# Patient Record
Sex: FEMALE | Race: WHITE | NOT HISPANIC OR LATINO | Employment: STUDENT | URBAN - METROPOLITAN AREA
[De-identification: names, ages, dates, MRNs, and addresses within clinical notes are randomized per-mention and may not be internally consistent; named-entity substitution may affect disease eponyms.]

---
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
## 2022-01-03 ENCOUNTER — OFFICE VISIT (OUTPATIENT)
Dept: DERMATOLOGY | Facility: CLINIC | Age: 22
End: 2022-01-03
Payer: COMMERCIAL

## 2022-01-03 VITALS — DIASTOLIC BLOOD PRESSURE: 90 MMHG | SYSTOLIC BLOOD PRESSURE: 137 MMHG | OXYGEN SATURATION: 100 % | HEART RATE: 116 BPM

## 2022-01-03 DIAGNOSIS — L70.0 ACNE VULGARIS: Primary | ICD-10-CM

## 2022-01-03 PROCEDURE — 99214 OFFICE O/P EST MOD 30 MIN: CPT | Performed by: PHYSICIAN ASSISTANT

## 2022-01-03 RX ORDER — DESOGESTREL AND ETHINYL ESTRADIOL 0.15-0.03
1 KIT ORAL DAILY
Qty: 84 TABLET | Refills: 3 | Status: SHIPPED | OUTPATIENT
Start: 2022-01-03 | End: 2022-11-29

## 2022-01-03 RX ORDER — TRETINOIN 0.25 MG/G
CREAM TOPICAL
Qty: 45 G | Refills: 11 | Status: SHIPPED | OUTPATIENT
Start: 2022-01-03 | End: 2022-10-20

## 2022-01-03 RX ORDER — DOXYCYCLINE 100 MG/1
CAPSULE ORAL
Qty: 60 CAPSULE | Refills: 1 | Status: SHIPPED | OUTPATIENT
Start: 2022-01-03 | End: 2022-01-25

## 2022-01-03 RX ORDER — SPIRONOLACTONE 100 MG/1
TABLET, FILM COATED ORAL
Qty: 90 TABLET | Refills: 3 | Status: SHIPPED | OUTPATIENT
Start: 2022-01-03 | End: 2022-12-15

## 2022-01-03 NOTE — PATIENT INSTRUCTIONS
Directions for acne:    PM  1. Wash  2. Apply a pea size of tretinoin cream to entire face  3. Moisturizer over    Start doxycycline (antibiotic) for 2 months with food and full glass of water  Start spironolactone once daily    Follow-up in 3-4 months          ACNE INFORMATION     Acne is a skin disease affecting oil glands and hair follicles in your skin.  When these pores do not drain properly, hair follicles can becomes clogged and bacteria can be trapped causing inflammation to occur. Clinical manifestations range from mild to severe, such as comedones (whiteheads and blackheads) or cysts.     Several factors contribute to acne:     1. Hormonal- Androgen (a type of hormone) can cause oil glands to enlarges and produces more sebum (oil).    2. Bacterial- A specific type of bacteria called Propionibacterium acnes are found in these oily follicles and stimulate more inflammation.     3. Genetics- History of family members (parents or siblings)     4. External factors- mechanical trauma, cosmetics, topical steroids or some oral medications (testosterone, lithium).       Acne can be effectively treated, although response may sometimes be slow. It may take 3-4 months to see 50% improvement.   Where possible, avoid excessively humid conditions such as a sauna, working in an unventilated kitchen or tropical vacations.   If you smoke, stop. Nicotine increases sebum retention and increased scale within the follicles, forming comedones (black and whiteheads).    Minimize the application of oils and cosmetics to the affected skin.   Abrasive skin treatments can aggravate acne.   Try not to scratch or pick the spots.   No relationship between particular foods and acne has been proven (except for skim milk) However, reports suggest low glycemic and low dairy diet are helpful for some people.    Acne myths:    These factors have little effect on acne:  --Chocolate and greasy foods. Eating chocolate or greasy food has little  to no effect on acne.  --Hygiene. Acne isn't caused by dirty skin. In fact, scrubbing the skin too hard or cleansing with harsh soaps or chemicals irritates the skin and can make acne worse.  --Cosmetics. Cosmetics don't necessarily worsen acne, especially if you use oil-free makeup that doesn't clog pores (noncomedogenics) and remove makeup regularly. Nonoily cosmetics don't interfere with the effectiveness of acne drugs.  --Drinking large amounts of water will NOT help acne.

## 2022-01-03 NOTE — PROGRESS NOTES
HPI:   CC: Kandice Mcgowan is a pleasant 21 year old female who presents for evaluation and treatment of acne  Condition has been present for: years. She completed isotretinoin in 2019 and her acne has slowly returned since that time. It is on the face; chest and back have remained clear since isotretinoin.   Pt complains of pain: Yes       Current Outpatient Medications   Medication Sig Dispense Refill     desogestrel-ethinyl estradiol (APRI) 0.15-30 MG-MCG tablet Take 1 tablet by mouth daily 84 tablet 3     doxycycline monohydrate (MONODOX) 100 MG capsule 1 cap PO BID with food and full glass of water 60 capsule 1     spironolactone (ALDACTONE) 100 MG tablet 1 tab po daily 90 tablet 3     tretinoin (RETIN-A) 0.025 % external cream Apply a pea size to entire face QD 45 g 11     ibuprofen (ADVIL,MOTRIN) 100 MG chewable tablet Take 400 mg by mouth every 8 hours as needed for fever       Loratadine (CLARITIN PO) Take  by mouth.       MIRALAX OR as needed       Allergies   Allergen Reactions     Amoxicillin Rash     Augmentin Rash     Ceftin [Cefuroxime] Rash     Denies any other skin complaints, in general feels well: Yes  Review of symptoms otherwise negative:Yes    PHYSICAL EXAM:   A&Ox3: Yes   Well developed/well nourished female Yes   Mood appropriate Yes      BP (!) 137/90   Pulse 116   SpO2 100%   Type 2 skin. Mood appropriate  Alert and Oriented X 3. Well developed, well nourished in no distress.  General appearance: Normal  Head including face: Normal  Eyes: conjunctiva and lids: Normal  Mouth: Lips, teeth, gums: Normal  Neck: Normal  Back:   Cardiovascular: Exam of peripheral vascular system by observation for swelling, varicosities, edema: Normal  Extremities: digits/nails (clubbing): Normal  Right upper extremity: Normal  Left upper extremity: Normal  Right lower extremity: Normal  Left lower extremity: Normal  Skin: Scalp and body hair: See below     Comedones Papules/Pustules Cysts Staining Scarring    Face/Neck 1-2+ 2+ lower cheeks 0 2+ 0   Chest 0 0 0 0 0   Back 0 0 0 0 0     Telangiectasias: No Fixed Erythema: No Exoriations: No   Other Physical Exam Findings:    ASSESSMENT & PLAN:     1. Acne Vulgaris - advised on diagnosis and treatment options. Discussed use of topical medications and antibiotics. Discussed spironolactone vs isotretinoin. She would prefer to start with isotretinoin for now.   --Start doxycycline 100 mg BID x 2 months. Advised to take with food. Discussed risk of GI upset, esophagitis and photosensitivity.   --Start spironolactone 100 mg daily. Advised on potential for hypotension, teratogenetic effects, menstrual irregularities, hyperkalemia and need for Q 6 month K+ checks.   --Start tretinoin 0.025% cream daily. Discussed potential for dryness/irritation. Advised to use emollients if needed.            Pt advised on use and risks including photosensitivity, allergic reactions, GI upset, headaches, nausea, erythema, scaling, vertigo, asthralgias, blood clots:Yes    Follow-up: 3-4 months  CC:   Scribed By: Gosia Chen, MS, PA-C

## 2022-01-03 NOTE — LETTER
1/3/2022         RE: Kandice Mcgowan  [id number] [address] [id number]        Dear Colleague,    Thank you for referring your patient, Kandice Mcgowan, to the [facility]. Please see a copy of my visit note below.    HPI:   CC: Kandice Mcgowan is a pleasant 21 year old female who presents for evaluation and treatment of acne  Condition has been present for: years. She completed isotretinoin in 2019 and her acne has slowly returned since that time. It is on the face; chest and back have remained clear since isotretinoin.   Pt complains of pain: Yes       Current Outpatient Medications   Medication Sig Dispense Refill     desogestrel-ethinyl estradiol (APRI) 0.15-30 MG-MCG tablet Take 1 tablet by mouth daily 84 tablet 3     doxycycline monohydrate (MONODOX) 100 MG capsule 1 cap PO BID with food and full glass of water 60 capsule 1     spironolactone (ALDACTONE) 100 MG tablet 1 tab po daily 90 tablet 3     tretinoin (RETIN-A) 0.025 % external cream Apply a pea size to entire face QD 45 g 11     ibuprofen (ADVIL,MOTRIN) 100 MG chewable tablet Take 400 mg by mouth every 8 hours as needed for fever       Loratadine (CLARITIN PO) Take  by mouth.       MIRALAX OR as needed       Allergies   Allergen Reactions     Amoxicillin Rash     Augmentin Rash     Ceftin [Cefuroxime] Rash     Denies any other skin complaints, in general feels well: Yes  Review of symptoms otherwise negative:Yes    PHYSICAL EXAM:   A&Ox3: Yes   Well developed/well nourished female Yes   Mood appropriate Yes      BP (!) 137/90   Pulse 116   SpO2 100%   Type 2 skin. Mood appropriate  Alert and Oriented X 3. Well developed, well nourished in no distress.  General appearance: Normal  Head including face: Normal  Eyes: conjunctiva and lids: Normal  Mouth: Lips, teeth, gums: Normal  Neck: Normal  Back:   Cardiovascular: Exam of peripheral vascular system by observation for swelling, varicosities, edema: Normal  Extremities:  digits/nails (clubbing): Normal  Right upper extremity: Normal  Left upper extremity: Normal  Right lower extremity: Normal  Left lower extremity: Normal  Skin: Scalp and body hair: See below     Comedones Papules/Pustules Cysts Staining Scarring   Face/Neck 1-2+ 2+ lower cheeks 0 2+ 0   Chest 0 0 0 0 0   Back 0 0 0 0 0     Telangiectasias: No Fixed Erythema: No Exoriations: No   Other Physical Exam Findings:    ASSESSMENT & PLAN:     1. Acne Vulgaris - advised on diagnosis and treatment options. Discussed use of topical medications and antibiotics. Discussed spironolactone vs isotretinoin. She would prefer to start with isotretinoin for now.   --Start doxycycline 100 mg BID x 2 months. Advised to take with food. Discussed risk of GI upset, esophagitis and photosensitivity.   --Start spironolactone 100 mg daily. Advised on potential for hypotension, teratogenetic effects, menstrual irregularities, hyperkalemia and need for Q 6 month K+ checks.   --Start tretinoin 0.025% cream daily. Discussed potential for dryness/irritation. Advised to use emollients if needed.            Pt advised on use and risks including photosensitivity, allergic reactions, GI upset, headaches, nausea, erythema, scaling, vertigo, asthralgias, blood clots:Yes    Follow-up: 3-4 months  CC:   Scribed By: Gosia Chen, MS, PAOTONIEL          Again, thank you for allowing me to participate in the care of your patient.        Sincerely,        Gosia Chen PA-C

## 2022-01-30 ENCOUNTER — MYC REFILL (OUTPATIENT)
Dept: DERMATOLOGY | Facility: CLINIC | Age: 22
End: 2022-01-30
Payer: COMMERCIAL

## 2022-01-30 DIAGNOSIS — L70.0 ACNE VULGARIS: ICD-10-CM

## 2022-01-31 RX ORDER — DOXYCYCLINE 100 MG/1
CAPSULE ORAL
Qty: 60 CAPSULE | Refills: 0 | Status: SHIPPED | OUTPATIENT
Start: 2022-01-31 | End: 2022-10-20

## 2022-02-24 NOTE — PROGRESS NOTES
HPI:   Kandice Mcgowan is a 17 year old female who presents for acne on isotretinoin s/p month #3.   chief complaint  Condition has been present for: over 1 year  Pt complains of pain: Yes     Previous treatments include: numerous OTC, clindamycin and adapalene  Areas Involved: face, chest and back  Current Outpatient Medications   Medication Sig Dispense Refill     desogestrel-ethinyl estradiol (APRI) 0.15-30 MG-MCG per tablet Take 1 tablet by mouth daily 84 tablet 3     ibuprofen (ADVIL,MOTRIN) 100 MG chewable tablet Take 400 mg by mouth every 8 hours as needed for fever       ISOtretinoin (ABSORICA) 30 MG capsule Take 1 capsule (30 mg) by mouth 2 times daily 60 capsule 0     Loratadine (CLARITIN PO) Take  by mouth.       MIRALAX OR as needed       Allergies   Allergen Reactions     Amoxicillin Rash     Augmentin Rash     Ceftin [Cefuroxime] Rash     Denies any other skin complaints, in general feels well: Yes  Review of symptoms otherwise negative:Yes    PHYSICAL EXAM:   A&Ox3: Yes   Well developed/well nourished female Yes   Mood appropriate Yes      /80   Pulse 99   SpO2 99%   Type 2 skin. Mood appropriate  Alert and Oriented X 3. Well developed, well nourished in no distress.  General appearance: Normal  Head including face: Normal  Eyes: conjunctiva and lids: Normal  Mouth: Lips, teeth, gums: Normal  Neck: Normal  Back: 2+ papules and comedones  Cardiovascular: Exam of peripheral vascular system by observation for swelling, varicosities, edema: Normal  Extremities: digits/nails (clubbing): Normal  Right upper extremity: Normal  Left upper extremity: Normal  Right lower extremity: Normal  Left lower extremity: Normal  Skin: Scalp and body hair: See below     Comedones Papules/Pustules Cysts Staining Scarring   Face/Neck 1+ 0 0 1+ 0   Chest 1+ 0 0 0 0   Back 1+ 1+ 0 0 0     Telangiectasias: No Fixed Erythema: No Exoriations: No   Other Physical Exam Findings:    ASSESSMENT & PLAN:     1. Acne Vulgaris  Report given on pt at approx 2010. Pt transferred to stepdown unit room 8098 in stable condition with family at bedside. This RN and charge RN transported pt on bedside monitor to adequately visualize pt's rhythm and VS. Meds delivered with pt.   - s/p 3 month of isotretinoin and doing well. Back pain has resolved.Mood has been good. No depression. No personal h/o depression; no personal or fhx of IBD. She is abstinent. UCG negative today.     Accutane is discussed fully with the patient. It is a very effective drug to treat acne vulgaris but has many significant side effects. Chief among these are teratogensis, hepatic injury, dyslipidemia and severe drying of the mucous membranes. All of these issues have been discussed in details. Monthly blood tests to monitor lipids and liver functions will be necessary. Expect painful dryness and/or fissuring around the lips, eyes, and other moist areas of the body. Balms may be protective. Contact lens may be too painful to wear temporarily while on this drug. Episodes of significant depression have been reported, including suicidal ideation and attempts in rare cases. It may also cause pseudotumor cerebri and hyperostosis. The patient will report any such changes in mood, depressive symptoms or suicidal thoughts, headaches, joint or bone pains.    Female patients MUST use two simultaneous methods of family planning. Accutane is Category X for pregnancy, meaning it will cause fetal teratogenic malformations, and pregnancy MUST be avoided while on this drug.    The dose is 0.5-1 mg/kg in two divided doses for 15-20 weeks.    After discussion of these important issues, s/he indicates complete understanding of all of the above, and does wish to proceed with accutane therapy.    --Continue isotretinoin 60 mg daily with food month #4  --Total dose 4800 mg  --Goal dose 11,659 mg  --Form of birth control is abstinence   --Continue Desogen every day. Advised to take at same time every single day. Discussed increased risk of DVT; denies any personal or fhx of coagulopathy; does not smoke. Advised if experiencing any unexplained pain or swelling in legs, CP or SOB to contact clinic immediately.   --Standing CBC CMP lipid panel,  [facility]  --iPledge [phone]          Pt advised on use and risks including photosensitivity, allergic reactions, GI upset, headaches, nausea, erythema, scaling, vertigo, asthralgias, blood clots:Yes    Follow-up: 1 month  CC:   Scribed By: Gosia Chen MS, PAJorgeC

## 2022-03-01 DIAGNOSIS — L70.0 ACNE VULGARIS: ICD-10-CM

## 2022-03-01 NOTE — TELEPHONE ENCOUNTER
Seen 1-3-22 and was only to be on Doxycyline x 2 months.       Did patient request refill? Or is this an automated refill request?    Is scheduled for follow up appt in April.     Wanda Noe RN

## 2022-03-01 NOTE — TELEPHONE ENCOUNTER
Requested Prescriptions   Pending Prescriptions Disp Refills     doxycycline monohydrate (MONODOX) 100 MG capsule 60 capsule 0     Si cap PO BID with food and full glass of water       There is no refill protocol information for this order        Last office visit: 1/3/2022 with prescribing provider:  GOSIA CHEN    Future Office Visit:   Next 5 appointments (look out 90 days)    2022  2:15 PM  (Arrive by 2:00 PM)  Return Visit with Gosia Chen PA-C  [facility] ([facility] ) [address] [facility] [id number] [phone]               [facility]  [facility]

## 2022-03-04 RX ORDER — DOXYCYCLINE 100 MG/1
CAPSULE ORAL
Qty: 60 CAPSULE | Refills: 0 | Status: CANCELLED | OUTPATIENT
Start: 2022-03-04

## 2022-03-04 RX ORDER — DOXYCYCLINE 100 MG/1
CAPSULE ORAL
Qty: 60 CAPSULE | Refills: 0 | OUTPATIENT
Start: 2022-03-04

## 2022-03-04 NOTE — TELEPHONE ENCOUNTER
Pt mycharted and stated she did not request a refill of Doxycycline. Refill request denied.  Griselda MOONEY RN BSN PHN  Specialty Clinics

## 2022-04-11 ENCOUNTER — OFFICE VISIT (OUTPATIENT)
Dept: DERMATOLOGY | Facility: CLINIC | Age: 22
End: 2022-04-11
Payer: COMMERCIAL

## 2022-04-11 VITALS — HEART RATE: 122 BPM | OXYGEN SATURATION: 99 %

## 2022-04-11 DIAGNOSIS — L70.0 ACNE VULGARIS: Primary | ICD-10-CM

## 2022-04-11 PROCEDURE — 99213 OFFICE O/P EST LOW 20 MIN: CPT | Performed by: PHYSICIAN ASSISTANT

## 2022-04-11 RX ORDER — SPIRONOLACTONE 50 MG/1
TABLET, FILM COATED ORAL
Qty: 90 TABLET | Refills: 3 | Status: SHIPPED | OUTPATIENT
Start: 2022-04-11 | End: 2023-01-19

## 2022-04-11 NOTE — PROGRESS NOTES
HPI:   CC: Kandice Mcgowan is a pleasant 21 year old female who presents for recheck of acne. She is currently on spironolactone 100 mg and tretinoin. She has had improvement but is still making new cysts.   Condition has been present for: years. She completed isotretinoin in 2019 and her acne has slowly returned since that time. It is on the face; chest and back have remained clear since isotretinoin.   Pt complains of pain: Yes       Current Outpatient Medications   Medication Sig Dispense Refill     desogestrel-ethinyl estradiol (APRI) 0.15-30 MG-MCG tablet Take 1 tablet by mouth daily 84 tablet 3     ibuprofen (ADVIL,MOTRIN) 100 MG chewable tablet Take 400 mg by mouth every 8 hours as needed for fever       Loratadine (CLARITIN PO) Take  by mouth.       MIRALAX OR as needed       spironolactone (ALDACTONE) 100 MG tablet 1 tab po daily 90 tablet 3     tretinoin (RETIN-A) 0.025 % external cream Apply a pea size to entire face QD 45 g 11     doxycycline monohydrate (MONODOX) 100 MG capsule 1 cap PO BID with food and full glass of water (Patient not taking: Reported on 4/11/2022) 60 capsule 0     Allergies   Allergen Reactions     Amoxicillin Rash     Augmentin Rash     Ceftin [Cefuroxime] Rash     Denies any other skin complaints, in general feels well: Yes  Review of symptoms otherwise negative:Yes    PHYSICAL EXAM:   A&Ox3: Yes   Well developed/well nourished female Yes   Mood appropriate Yes      Pulse (!) 122   SpO2 99%   Type 2 skin. Mood appropriate  Alert and Oriented X 3. Well developed, well nourished in no distress.  General appearance: Normal  Head including face: Normal  Eyes: conjunctiva and lids: Normal  Mouth: Lips, teeth, gums: Normal  Neck: Normal  Back:   Cardiovascular: Exam of peripheral vascular system by observation for swelling, varicosities, edema: Normal  Extremities: digits/nails (clubbing): Normal  Right upper extremity: Normal  Left upper extremity: Normal  Right lower extremity:  Normal  Left lower extremity: Normal  Skin: Scalp and body hair: See below     Comedones Papules/Pustules Cysts Staining Scarring   Face/Neck 1-2+ 0-1+ lower cheeks 0 2-3+ 0   Chest 0 0 0 0 0   Back 0 0 0 0 0     Telangiectasias: No Fixed Erythema: No Exoriations: No   Other Physical Exam Findings:    ASSESSMENT & PLAN:     1. Acne Vulgaris - improved but still making new cysts. Discussed increasing dose of spironolactone and she is amenable to trying this. She does not want another antibiotic. she does like the tretinoin cream. advised on diagnosis and treatment options. Discussed use of topical medications and antibiotics.      --Increase to spironolactone 150 mg daily. Advised on potential for hypotension, teratogenetic effects, menstrual irregularities, hyperkalemia and need for Q 6 month K+ checks.   --Continue tretinoin 0.025% cream daily. Discussed potential for dryness/irritation. Advised to use emollients if needed.            Pt advised on use and risks including photosensitivity, allergic reactions, GI upset, headaches, nausea, erythema, scaling, vertigo, asthralgias, blood clots:Yes    Follow-up: 3-4 months  CC:   Scribed By: Gosia Chen, MS, PA-C

## 2022-04-11 NOTE — LETTER
4/11/2022         RE: Knadice Mcgowan  [address] [address] [address]        Dear Colleague,    Thank you for referring your patient, Kandice Mcgowan, to the [facility]. Please see a copy of my visit note below.    HPI:   CC: Kandice Mcgowan is a pleasant 21 year old female who presents for recheck of acne. She is currently on spironolactone 100 mg and tretinoin. She has had improvement but is still making new cysts.   Condition has been present for: years. She completed isotretinoin in 2019 and her acne has slowly returned since that time. It is on the face; chest and back have remained clear since isotretinoin.   Pt complains of pain: Yes       Current Outpatient Medications   Medication Sig Dispense Refill     desogestrel-ethinyl estradiol (APRI) 0.15-30 MG-MCG tablet Take 1 tablet by mouth daily 84 tablet 3     ibuprofen (ADVIL,MOTRIN) 100 MG chewable tablet Take 400 mg by mouth every 8 hours as needed for fever       Loratadine (CLARITIN PO) Take  by mouth.       MIRALAX OR as needed       spironolactone (ALDACTONE) 100 MG tablet 1 tab po daily 90 tablet 3     tretinoin (RETIN-A) 0.025 % external cream Apply a pea size to entire face QD 45 g 11     doxycycline monohydrate (MONODOX) 100 MG capsule 1 cap PO BID with food and full glass of water (Patient not taking: Reported on 4/11/2022) 60 capsule 0     Allergies   Allergen Reactions     Amoxicillin Rash     Augmentin Rash     Ceftin [Cefuroxime] Rash     Denies any other skin complaints, in general feels well: Yes  Review of symptoms otherwise negative:Yes    PHYSICAL EXAM:   A&Ox3: Yes   Well developed/well nourished female Yes   Mood appropriate Yes      Pulse (!) 122   SpO2 99%   Type 2 skin. Mood appropriate  Alert and Oriented X 3. Well developed, well nourished in no distress.  General appearance: Normal  Head including face: Normal  Eyes: conjunctiva and lids: Normal  Mouth: Lips, teeth, gums: Normal  Neck:  Normal  Back:   Cardiovascular: Exam of peripheral vascular system by observation for swelling, varicosities, edema: Normal  Extremities: digits/nails (clubbing): Normal  Right upper extremity: Normal  Left upper extremity: Normal  Right lower extremity: Normal  Left lower extremity: Normal  Skin: Scalp and body hair: See below     Comedones Papules/Pustules Cysts Staining Scarring   Face/Neck 1-2+ 0-1+ lower cheeks 0 2-3+ 0   Chest 0 0 0 0 0   Back 0 0 0 0 0     Telangiectasias: No Fixed Erythema: No Exoriations: No   Other Physical Exam Findings:    ASSESSMENT & PLAN:     1. Acne Vulgaris - improved but still making new cysts. Discussed increasing dose of spironolactone and she is amenable to trying this. She does not want another antibiotic. she does like the tretinoin cream. advised on diagnosis and treatment options. Discussed use of topical medications and antibiotics.      --Increase to spironolactone 150 mg daily. Advised on potential for hypotension, teratogenetic effects, menstrual irregularities, hyperkalemia and need for Q 6 month K+ checks.   --Continue tretinoin 0.025% cream daily. Discussed potential for dryness/irritation. Advised to use emollients if needed.            Pt advised on use and risks including photosensitivity, allergic reactions, GI upset, headaches, nausea, erythema, scaling, vertigo, asthralgias, blood clots:Yes    Follow-up: 3-4 months  CC:   Scribed By: Gosia Chen, MS, PAOTONIEL        Again, thank you for allowing me to participate in the care of your patient.        Sincerely,        Gosia Chen PA-C

## 2022-07-02 ENCOUNTER — HEALTH MAINTENANCE LETTER (OUTPATIENT)
Age: 22
End: 2022-07-02

## 2022-08-11 ENCOUNTER — OFFICE VISIT (OUTPATIENT)
Dept: DERMATOLOGY | Facility: CLINIC | Age: 22
End: 2022-08-11
Payer: COMMERCIAL

## 2022-08-11 DIAGNOSIS — L70.0 ACNE VULGARIS: ICD-10-CM

## 2022-08-11 DIAGNOSIS — Z51.81 THERAPEUTIC DRUG MONITORING: Primary | ICD-10-CM

## 2022-08-11 LAB
ALBUMIN SERPL-MCNC: 3.7 G/DL (ref 3.4–5)
ALP SERPL-CCNC: 61 U/L (ref 40–150)
ALT SERPL W P-5'-P-CCNC: 37 U/L (ref 0–50)
ANION GAP SERPL CALCULATED.3IONS-SCNC: 10 MMOL/L (ref 3–14)
AST SERPL W P-5'-P-CCNC: 18 U/L (ref 0–35)
BILIRUB SERPL-MCNC: 0.5 MG/DL (ref 0.2–1.3)
BUN SERPL-MCNC: 10 MG/DL (ref 7–30)
CALCIUM SERPL-MCNC: 9.7 MG/DL (ref 8.5–10.1)
CHLORIDE BLD-SCNC: 104 MMOL/L (ref 96–110)
CHOLEST SERPL-MCNC: 177 MG/DL
CO2 SERPL-SCNC: 23 MMOL/L (ref 20–32)
CREAT SERPL-MCNC: 0.61 MG/DL (ref 0.5–1)
ERYTHROCYTE [DISTWIDTH] IN BLOOD BY AUTOMATED COUNT: 12.8 % (ref 10–15)
FASTING STATUS PATIENT QL REPORTED: NO
GFR SERPL CREATININE-BSD FRML MDRD: >90 ML/MIN/1.73M2
GLUCOSE BLD-MCNC: 85 MG/DL (ref 70–99)
HCG UR QL: NEGATIVE
HCT VFR BLD AUTO: 42.2 % (ref 35–47)
HDLC SERPL-MCNC: 68 MG/DL
HGB BLD-MCNC: 14 G/DL (ref 11.7–15.7)
LDLC SERPL CALC-MCNC: 80 MG/DL
MCH RBC QN AUTO: 27.4 PG (ref 26.5–33)
MCHC RBC AUTO-ENTMCNC: 33.2 G/DL (ref 31.5–36.5)
MCV RBC AUTO: 83 FL (ref 78–100)
NONHDLC SERPL-MCNC: 109 MG/DL
PLATELET # BLD AUTO: 418 10E3/UL (ref 150–450)
POTASSIUM BLD-SCNC: 4 MMOL/L (ref 3.4–5.3)
PROT SERPL-MCNC: 8.3 G/DL (ref 6.8–8.8)
RBC # BLD AUTO: 5.11 10E6/UL (ref 3.8–5.2)
SODIUM SERPL-SCNC: 137 MMOL/L (ref 133–144)
TRIGL SERPL-MCNC: 143 MG/DL
WBC # BLD AUTO: 9.5 10E3/UL (ref 4–11)

## 2022-08-11 PROCEDURE — 80061 LIPID PANEL: CPT | Performed by: PHYSICIAN ASSISTANT

## 2022-08-11 PROCEDURE — 80053 COMPREHEN METABOLIC PANEL: CPT | Performed by: PHYSICIAN ASSISTANT

## 2022-08-11 PROCEDURE — 81025 URINE PREGNANCY TEST: CPT | Performed by: PHYSICIAN ASSISTANT

## 2022-08-11 PROCEDURE — 36415 COLL VENOUS BLD VENIPUNCTURE: CPT | Performed by: PHYSICIAN ASSISTANT

## 2022-08-11 PROCEDURE — 99214 OFFICE O/P EST MOD 30 MIN: CPT | Performed by: PHYSICIAN ASSISTANT

## 2022-08-11 PROCEDURE — 85027 COMPLETE CBC AUTOMATED: CPT | Performed by: PHYSICIAN ASSISTANT

## 2022-08-11 ASSESSMENT — PAIN SCALES - GENERAL: PAINLEVEL: NO PAIN (1)

## 2022-08-11 NOTE — PROGRESS NOTES
HPI:   CC: Kandice Mcgowan is a pleasant 21 year old female who presents for recheck of acne. She is currently on spironolactone 150 mg and tretinoin. She has had improvement but is still making new cysts.   Condition has been present for: years. She completed isotretinoin in 2019 and her acne has slowly returned since that time. It is on the face; chest and back have remained clear since isotretinoin.   Pt complains of pain: Yes       Current Outpatient Medications   Medication Sig Dispense Refill     desogestrel-ethinyl estradiol (APRI) 0.15-30 MG-MCG tablet Take 1 tablet by mouth daily 84 tablet 3     ibuprofen (ADVIL,MOTRIN) 100 MG chewable tablet Take 400 mg by mouth every 8 hours as needed for fever       Loratadine (CLARITIN PO) Take  by mouth.       MIRALAX OR as needed       spironolactone (ALDACTONE) 100 MG tablet 1 tab po daily 90 tablet 3     spironolactone (ALDACTONE) 50 MG tablet 1 tab po daily 90 tablet 3     tretinoin (RETIN-A) 0.025 % external cream Apply a pea size to entire face QD 45 g 11     doxycycline monohydrate (MONODOX) 100 MG capsule 1 cap PO BID with food and full glass of water (Patient not taking: No sig reported) 60 capsule 0     Allergies   Allergen Reactions     Amoxicillin Rash     Augmentin Rash     Ceftin [Cefuroxime] Rash     Denies any other skin complaints, in general feels well: Yes  Review of symptoms otherwise negative:Yes    PHYSICAL EXAM:   A&Ox3: Yes   Well developed/well nourished female Yes   Mood appropriate Yes      There were no vitals taken for this visit.  Type 2 skin. Mood appropriate  Alert and Oriented X 3. Well developed, well nourished in no distress.  General appearance: Normal  Head including face: Normal  Eyes: conjunctiva and lids: Normal  Mouth: Lips, teeth, gums: Normal  Neck: Normal  Back:   Cardiovascular: Exam of peripheral vascular system by observation for swelling, varicosities, edema: Normal  Extremities: digits/nails (clubbing): Normal  Right  upper extremity: Normal  Left upper extremity: Normal  Right lower extremity: Normal  Left lower extremity: Normal  Skin: Scalp and body hair: See below     Comedones Papules/Pustules Cysts Staining Scarring   Face/Neck 1-2+ 1-2+ 0 2-3+ 0   Chest 0 0 0 0 0   Back 0 0 0 0 0     Telangiectasias: No Fixed Erythema: No Exoriations: No   Other Physical Exam Findings:    ASSESSMENT & PLAN:     1. Acne Vulgaris - improved but still making new cysts. Discussed increasing dose of spironolactone vs topical candace vs another course of isotretinoin. She would like to repeat accutane. No IBD or depression.       --Continue spironolactone 150 mg daily. Advised on potential for hypotension, teratogenetic effects, menstrual irregularities, hyperkalemia and need for Q 6 month K+ checks.     Accutane is discussed fully with the patient. It is a very effective drug to treat acne vulgaris but has many significant side effects. Chief among these are teratogensis, hepatic injury, dyslipidemia and severe drying of the mucous membranes. All of these issues have been discussed in details. Monthly blood tests to monitor lipids and liver functions will be necessary. Expect painful dryness and/or fissuring around the lips, eyes, and other moist areas of the body. Balms may be protective. Contact lens may be too painful to wear temporarily while on this drug. Episodes of significant depression have been reported, including suicidal ideation and attempts in rare cases. It may also cause pseudotumor cerebri and hyperostosis. The patient will report any such changes in mood, depressive symptoms or suicidal thoughts, headaches, joint or bone pains.    Female patients MUST use two simultaneous methods of family planning. Accutane is Category X for pregnancy, meaning it will cause fetal teratogenic malformations, and pregnancy MUST be avoided while on this drug.    The dose is 0.5-1 mg/kg in two divided doses for 15-20 weeks.    After discussion of  these important issues, s/he indicates complete understanding of all of the above, and does wish to proceed with accutane therapy.    --iPlege:  --Standing labs  --Abstinence             Pt advised on use and risks including photosensitivity, allergic reactions, GI upset, headaches, nausea, erythema, scaling, vertigo, asthralgias, blood clots:Yes    Follow-up: 30+ days  CC:   Scribed By: Gosia Chen, MS, PA-C

## 2022-08-11 NOTE — NURSING NOTE
Chief Complaint   Patient presents with     Acne     This are ok, still breaking out on cheeks        There were no vitals filed for this visit.  Wt Readings from Last 1 Encounters:   12/08/21 103 kg (227 lb) (99 %, Z= 2.28)*     * Growth percentiles are based on CDC (Girls, 2-20 Years) data.       Elisabet Meyers LPN .................8/11/2022

## 2022-08-11 NOTE — LETTER
8/11/2022         RE: Kandice Mcgowan  [address] [address] [address]        Dear Colleague,    Thank you for referring your patient, Kandice Mcgowan, to the [facility]. Please see a copy of my visit note below.    HPI:   CC: Kandice Mcgowan is a pleasant 21 year old female who presents for recheck of acne. She is currently on spironolactone 150 mg and tretinoin. She has had improvement but is still making new cysts.   Condition has been present for: years. She completed isotretinoin in 2019 and her acne has slowly returned since that time. It is on the face; chest and back have remained clear since isotretinoin.   Pt complains of pain: Yes       Current Outpatient Medications   Medication Sig Dispense Refill     desogestrel-ethinyl estradiol (APRI) 0.15-30 MG-MCG tablet Take 1 tablet by mouth daily 84 tablet 3     ibuprofen (ADVIL,MOTRIN) 100 MG chewable tablet Take 400 mg by mouth every 8 hours as needed for fever       Loratadine (CLARITIN PO) Take  by mouth.       MIRALAX OR as needed       spironolactone (ALDACTONE) 100 MG tablet 1 tab po daily 90 tablet 3     spironolactone (ALDACTONE) 50 MG tablet 1 tab po daily 90 tablet 3     tretinoin (RETIN-A) 0.025 % external cream Apply a pea size to entire face QD 45 g 11     doxycycline monohydrate (MONODOX) 100 MG capsule 1 cap PO BID with food and full glass of water (Patient not taking: No sig reported) 60 capsule 0     Allergies   Allergen Reactions     Amoxicillin Rash     Augmentin Rash     Ceftin [Cefuroxime] Rash     Denies any other skin complaints, in general feels well: Yes  Review of symptoms otherwise negative:Yes    PHYSICAL EXAM:   A&Ox3: Yes   Well developed/well nourished female Yes   Mood appropriate Yes      There were no vitals taken for this visit.  Type 2 skin. Mood appropriate  Alert and Oriented X 3. Well developed, well nourished in no distress.  General appearance: Normal  Head including face: Normal  Eyes:  conjunctiva and lids: Normal  Mouth: Lips, teeth, gums: Normal  Neck: Normal  Back:   Cardiovascular: Exam of peripheral vascular system by observation for swelling, varicosities, edema: Normal  Extremities: digits/nails (clubbing): Normal  Right upper extremity: Normal  Left upper extremity: Normal  Right lower extremity: Normal  Left lower extremity: Normal  Skin: Scalp and body hair: See below     Comedones Papules/Pustules Cysts Staining Scarring   Face/Neck 1-2+ 1-2+ 0 2-3+ 0   Chest 0 0 0 0 0   Back 0 0 0 0 0     Telangiectasias: No Fixed Erythema: No Exoriations: No   Other Physical Exam Findings:    ASSESSMENT & PLAN:     1. Acne Vulgaris - improved but still making new cysts. Discussed increasing dose of spironolactone vs topical candace vs another course of isotretinoin. She would like to repeat accutane. No IBD or depression.       --Continue spironolactone 150 mg daily. Advised on potential for hypotension, teratogenetic effects, menstrual irregularities, hyperkalemia and need for Q 6 month K+ checks.     Accutane is discussed fully with the patient. It is a very effective drug to treat acne vulgaris but has many significant side effects. Chief among these are teratogensis, hepatic injury, dyslipidemia and severe drying of the mucous membranes. All of these issues have been discussed in details. Monthly blood tests to monitor lipids and liver functions will be necessary. Expect painful dryness and/or fissuring around the lips, eyes, and other moist areas of the body. Balms may be protective. Contact lens may be too painful to wear temporarily while on this drug. Episodes of significant depression have been reported, including suicidal ideation and attempts in rare cases. It may also cause pseudotumor cerebri and hyperostosis. The patient will report any such changes in mood, depressive symptoms or suicidal thoughts, headaches, joint or bone pains.    Female patients MUST use two simultaneous methods of  family planning. Accutane is Category X for pregnancy, meaning it will cause fetal teratogenic malformations, and pregnancy MUST be avoided while on this drug.    The dose is 0.5-1 mg/kg in two divided doses for 15-20 weeks.    After discussion of these important issues, s/he indicates complete understanding of all of the above, and does wish to proceed with accutane therapy.    --iPlege:  --Standing labs  --Abstinence             Pt advised on use and risks including photosensitivity, allergic reactions, GI upset, headaches, nausea, erythema, scaling, vertigo, asthralgias, blood clots:Yes    Follow-up: 30+ days  CC:   Scribed By: Gosia Chen, MS, PAOTONIEL      Again, thank you for allowing me to participate in the care of your patient.        Sincerely,        Gosia Chen PA-C

## 2022-09-22 ENCOUNTER — LAB (OUTPATIENT)
Dept: LAB | Facility: CLINIC | Age: 22
End: 2022-09-22
Payer: COMMERCIAL

## 2022-09-22 ENCOUNTER — VIRTUAL VISIT (OUTPATIENT)
Dept: DERMATOLOGY | Facility: CLINIC | Age: 22
End: 2022-09-22
Payer: COMMERCIAL

## 2022-09-22 DIAGNOSIS — Z51.81 THERAPEUTIC DRUG MONITORING: ICD-10-CM

## 2022-09-22 DIAGNOSIS — L70.0 ACNE VULGARIS: Primary | ICD-10-CM

## 2022-09-22 LAB — HCG UR QL: NEGATIVE

## 2022-09-22 PROCEDURE — 99214 OFFICE O/P EST MOD 30 MIN: CPT | Mod: 95 | Performed by: PHYSICIAN ASSISTANT

## 2022-09-22 PROCEDURE — 81025 URINE PREGNANCY TEST: CPT

## 2022-09-22 RX ORDER — ISOTRETINOIN 40 MG/1
CAPSULE ORAL
Qty: 30 CAPSULE | Refills: 0 | Status: SHIPPED | OUTPATIENT
Start: 2022-09-22 | End: 2023-12-07

## 2022-09-22 NOTE — LETTER
"    9/22/2022         RE: Kandice Mcgowan  [address] [facility] [address]        Dear Colleague,    Thank you for referring your patient, Kandice Mcgowan, to the [facility]. Please see a copy of my visit note below.    The patient has been notified of the following:      \"We have found that certain health care needs can be provided without the need for a face to face visit.  This service lets us provide the care you need with a phone conversation.       I will have full access to your [place] medical record during this entire phone call.   I will be taking notes for your medical record.      Since this is like an office visit, we will bill your insurance company for this service.       There are potential benefits and risks of telephone visits (e.g. limits to patient confidentiality) that differ from in-person visits.?  Confidentiality still applies for telephone services, and nobody will record the visit.  It is important to be in a quiet, private space that is free of distractions (including cell phone or other devices) during the visit.??      If during the course of the call I believe a telephone visit is not appropriate, you will not be charged for this service\"     Consent has been obtained for this service by care team member: Yes   HPI:   CC: Kandice Mcgowan is a pleasant 21 year old female who presents via video visit for recheck of acne. Here today for accutane start.   Condition has been present for: years. She completed isotretinoin in 2019 and her acne has slowly returned since that time. It is on the face; chest and back have remained clear since isotretinoin.   Pt complains of pain: Yes       Current Outpatient Medications   Medication Sig Dispense Refill     desogestrel-ethinyl estradiol (APRI) 0.15-30 MG-MCG tablet Take 1 tablet by mouth daily 84 tablet 3     ibuprofen (ADVIL,MOTRIN) 100 MG chewable tablet Take 400 mg by mouth every 8 hours as needed for fever       " Loratadine (CLARITIN PO) Take  by mouth.       MIRALAX OR as needed       spironolactone (ALDACTONE) 100 MG tablet 1 tab po daily 90 tablet 3     spironolactone (ALDACTONE) 50 MG tablet 1 tab po daily 90 tablet 3     tretinoin (RETIN-A) 0.025 % external cream Apply a pea size to entire face QD 45 g 11     doxycycline monohydrate (MONODOX) 100 MG capsule 1 cap PO BID with food and full glass of water (Patient not taking: Reported on 9/22/2022) 60 capsule 0     Allergies   Allergen Reactions     Amoxicillin Rash     Augmentin Rash     Ceftin [Cefuroxime] Rash     Denies any other skin complaints, in general feels well: Yes  Review of symptoms otherwise negative:Yes    PHYSICAL EXAM:   A&Ox3: Yes   Well developed/well nourished female Yes   Mood appropriate Yes      There were no vitals taken for this visit.  Type 2 skin. Mood appropriate  Alert and Oriented X 3. Well developed, well nourished in no distress.  General appearance: Normal  Head including face: Normal  Eyes: conjunctiva and lids: Normal  Mouth: Lips, teeth, gums: Normal  Neck: Normal  Skin: Scalp and body hair: See below     Comedones Papules/Pustules Cysts Staining Scarring   Face/Neck 1-2+ 1-2+ 0 2-3+ 0   Chest 0 0 0 0 0   Back 0 0 0 0 0     Telangiectasias: No Fixed Erythema: No Exoriations: No   Other Physical Exam Findings:    ASSESSMENT & PLAN:     1. Acne Vulgaris - here for accutane start. This is her second course.     Accutane is discussed fully with the patient. It is a very effective drug to treat acne vulgaris but has many significant side effects. Chief among these are teratogensis, hepatic injury, dyslipidemia and severe drying of the mucous membranes. All of these issues have been discussed in details. Monthly blood tests to monitor lipids and liver functions will be necessary. Expect painful dryness and/or fissuring around the lips, eyes, and other moist areas of the body. Balms may be protective. Contact lens may be too painful to wear  temporarily while on this drug. Episodes of significant depression have been reported, including suicidal ideation and attempts in rare cases. It may also cause pseudotumor cerebri and hyperostosis. The patient will report any such changes in mood, depressive symptoms or suicidal thoughts, headaches, joint or bone pains.    Female patients MUST use two simultaneous methods of family planning. Accutane is Category X for pregnancy, meaning it will cause fetal teratogenic malformations, and pregnancy MUST be avoided while on this drug.    The dose is 0.5-1 mg/kg in two divided doses for 15-20 weeks.    After discussion of these important issues, s/he indicates complete understanding of all of the above, and does wish to proceed with accutane therapy.    --iPlege: [id number]  --Standing labs  --Abstinence   --Continue spironolactone for now  --Start isotretinoin 40 mg daily   --Goal: 20146 - 68261 mg        Pt advised on use and risks including photosensitivity, allergic reactions, GI upset, headaches, nausea, erythema, scaling, vertigo, asthralgias, blood clots:Yes    Follow-up: 1 month  CC:   Scribed By: Gosia Chen, MS, PAJorgeC      Again, thank you for allowing me to participate in the care of your patient.        Sincerely,        Gosia Chen PA-C

## 2022-09-22 NOTE — PROGRESS NOTES
"The patient has been notified of the following:      \"We have found that certain health care needs can be provided without the need for a face to face visit.  This service lets us provide the care you need with a phone conversation.       I will have full access to your [place] medical record during this entire phone call.   I will be taking notes for your medical record.      Since this is like an office visit, we will bill your insurance company for this service.       There are potential benefits and risks of telephone visits (e.g. limits to patient confidentiality) that differ from in-person visits.?  Confidentiality still applies for telephone services, and nobody will record the visit.  It is important to be in a quiet, private space that is free of distractions (including cell phone or other devices) during the visit.??      If during the course of the call I believe a telephone visit is not appropriate, you will not be charged for this service\"     Consent has been obtained for this service by care team member: Yes   HPI:   CC: Kandice Mcgowan is a pleasant 21 year old female who presents via video visit for recheck of acne. Here today for accutane start.   Condition has been present for: years. She completed isotretinoin in 2019 and her acne has slowly returned since that time. It is on the face; chest and back have remained clear since isotretinoin.   Pt complains of pain: Yes       Current Outpatient Medications   Medication Sig Dispense Refill     desogestrel-ethinyl estradiol (APRI) 0.15-30 MG-MCG tablet Take 1 tablet by mouth daily 84 tablet 3     ibuprofen (ADVIL,MOTRIN) 100 MG chewable tablet Take 400 mg by mouth every 8 hours as needed for fever       Loratadine (CLARITIN PO) Take  by mouth.       MIRALAX OR as needed       spironolactone (ALDACTONE) 100 MG tablet 1 tab po daily 90 tablet 3     spironolactone (ALDACTONE) 50 MG tablet 1 tab po daily 90 tablet 3     tretinoin (RETIN-A) 0.025 % " external cream Apply a pea size to entire face QD 45 g 11     doxycycline monohydrate (MONODOX) 100 MG capsule 1 cap PO BID with food and full glass of water (Patient not taking: Reported on 9/22/2022) 60 capsule 0     Allergies   Allergen Reactions     Amoxicillin Rash     Augmentin Rash     Ceftin [Cefuroxime] Rash     Denies any other skin complaints, in general feels well: Yes  Review of symptoms otherwise negative:Yes    PHYSICAL EXAM:   A&Ox3: Yes   Well developed/well nourished female Yes   Mood appropriate Yes      There were no vitals taken for this visit.  Type 2 skin. Mood appropriate  Alert and Oriented X 3. Well developed, well nourished in no distress.  General appearance: Normal  Head including face: Normal  Eyes: conjunctiva and lids: Normal  Mouth: Lips, teeth, gums: Normal  Neck: Normal  Skin: Scalp and body hair: See below     Comedones Papules/Pustules Cysts Staining Scarring   Face/Neck 1-2+ 1-2+ 0 2-3+ 0   Chest 0 0 0 0 0   Back 0 0 0 0 0     Telangiectasias: No Fixed Erythema: No Exoriations: No   Other Physical Exam Findings:    ASSESSMENT & PLAN:     1. Acne Vulgaris - here for accutane start. This is her second course.     Accutane is discussed fully with the patient. It is a very effective drug to treat acne vulgaris but has many significant side effects. Chief among these are teratogensis, hepatic injury, dyslipidemia and severe drying of the mucous membranes. All of these issues have been discussed in details. Monthly blood tests to monitor lipids and liver functions will be necessary. Expect painful dryness and/or fissuring around the lips, eyes, and other moist areas of the body. Balms may be protective. Contact lens may be too painful to wear temporarily while on this drug. Episodes of significant depression have been reported, including suicidal ideation and attempts in rare cases. It may also cause pseudotumor cerebri and hyperostosis. The patient will report any such changes in  mood, depressive symptoms or suicidal thoughts, headaches, joint or bone pains.    Female patients MUST use two simultaneous methods of family planning. Accutane is Category X for pregnancy, meaning it will cause fetal teratogenic malformations, and pregnancy MUST be avoided while on this drug.    The dose is 0.5-1 mg/kg in two divided doses for 15-20 weeks.    After discussion of these important issues, s/he indicates complete understanding of all of the above, and does wish to proceed with accutane therapy.    --[facility]: [id number]  --Standing labs  --Abstinence   --Continue spironolactone for now  --Start isotretinoin 40 mg daily   --Goal: 36809 - 57339 mg        Pt advised on use and risks including photosensitivity, allergic reactions, GI upset, headaches, nausea, erythema, scaling, vertigo, asthralgias, blood clots:Yes    Follow-up: 1 month  CC:   Scribed By: Gosia Chen, MS, PAOTONIEL

## 2022-10-20 ENCOUNTER — VIRTUAL VISIT (OUTPATIENT)
Dept: DERMATOLOGY | Facility: CLINIC | Age: 22
End: 2022-10-20
Payer: COMMERCIAL

## 2022-10-20 ENCOUNTER — TELEPHONE (OUTPATIENT)
Dept: FAMILY MEDICINE | Facility: CLINIC | Age: 22
End: 2022-10-20

## 2022-10-20 ENCOUNTER — LAB (OUTPATIENT)
Dept: LAB | Facility: CLINIC | Age: 22
End: 2022-10-20
Payer: COMMERCIAL

## 2022-10-20 DIAGNOSIS — L70.0 ACNE VULGARIS: ICD-10-CM

## 2022-10-20 DIAGNOSIS — L70.0 ACNE VULGARIS: Primary | ICD-10-CM

## 2022-10-20 DIAGNOSIS — Z51.81 THERAPEUTIC DRUG MONITORING: Primary | ICD-10-CM

## 2022-10-20 LAB
ALBUMIN SERPL-MCNC: 3.7 G/DL (ref 3.4–5)
ALP SERPL-CCNC: 65 U/L (ref 40–150)
ALT SERPL W P-5'-P-CCNC: 28 U/L (ref 0–50)
ANION GAP SERPL CALCULATED.3IONS-SCNC: 9 MMOL/L (ref 3–14)
AST SERPL W P-5'-P-CCNC: 16 U/L (ref 0–35)
BILIRUB SERPL-MCNC: 0.4 MG/DL (ref 0.2–1.3)
BUN SERPL-MCNC: 9 MG/DL (ref 7–30)
CALCIUM SERPL-MCNC: 9.7 MG/DL (ref 8.5–10.1)
CHLORIDE BLD-SCNC: 107 MMOL/L (ref 96–110)
CHOLEST SERPL-MCNC: 192 MG/DL
CO2 SERPL-SCNC: 22 MMOL/L (ref 20–32)
CREAT SERPL-MCNC: 0.63 MG/DL (ref 0.5–1)
ERYTHROCYTE [DISTWIDTH] IN BLOOD BY AUTOMATED COUNT: 13.3 % (ref 10–15)
FASTING STATUS PATIENT QL REPORTED: NO
GFR SERPL CREATININE-BSD FRML MDRD: >90 ML/MIN/1.73M2
GLUCOSE BLD-MCNC: 106 MG/DL (ref 70–99)
HCT VFR BLD AUTO: 42.1 % (ref 35–47)
HDLC SERPL-MCNC: 62 MG/DL
HGB BLD-MCNC: 14 G/DL (ref 11.7–15.7)
LDLC SERPL CALC-MCNC: 96 MG/DL
MCH RBC QN AUTO: 27.3 PG (ref 26.5–33)
MCHC RBC AUTO-ENTMCNC: 33.3 G/DL (ref 31.5–36.5)
MCV RBC AUTO: 82 FL (ref 78–100)
NONHDLC SERPL-MCNC: 130 MG/DL
PLATELET # BLD AUTO: 434 10E3/UL (ref 150–450)
POTASSIUM BLD-SCNC: 3.7 MMOL/L (ref 3.4–5.3)
PROT SERPL-MCNC: 8.4 G/DL (ref 6.8–8.8)
RBC # BLD AUTO: 5.12 10E6/UL (ref 3.8–5.2)
SODIUM SERPL-SCNC: 138 MMOL/L (ref 133–144)
TRIGL SERPL-MCNC: 168 MG/DL
WBC # BLD AUTO: 6.1 10E3/UL (ref 4–11)

## 2022-10-20 PROCEDURE — 99214 OFFICE O/P EST MOD 30 MIN: CPT | Mod: 95 | Performed by: PHYSICIAN ASSISTANT

## 2022-10-20 PROCEDURE — 36415 COLL VENOUS BLD VENIPUNCTURE: CPT

## 2022-10-20 PROCEDURE — 80053 COMPREHEN METABOLIC PANEL: CPT

## 2022-10-20 PROCEDURE — 85027 COMPLETE CBC AUTOMATED: CPT

## 2022-10-20 PROCEDURE — 80061 LIPID PANEL: CPT

## 2022-10-20 RX ORDER — ISOTRETINOIN 40 MG/1
CAPSULE ORAL
Qty: 30 CAPSULE | Refills: 0 | Status: CANCELLED | OUTPATIENT
Start: 2022-10-20

## 2022-10-20 NOTE — TELEPHONE ENCOUNTER
While having blood drawn in the lab, this patient became dizzy and fainted in the lab chair.  There was LOC per lab personal.  Upon arriving in lab this RN saw patient sitting in a chair with her head down and complaining of mid-sternal chest pain.    Patient was pale. Patient was brought by wheelchair to to a bed and laid down with no pillow. Vital signs were taken. Crackers and an apple juice box were given to patient to eat and vitals were taken and recorded.  BP and pulse low so RN stayed with patient. Dr Benoit talked with patient.  After several minutes patient stated he felt fine and denied dizziness, chest pain, and nausea.  Vital signs were again taken. Vital sign equipment removed from patient and discharged.    11:48am  /69  P  107  Spo2: 100  11:53am  BP  114/72  P  90  Sp02:  100  12:02       /84    P  91    Spo2:  100     Viviana Callejas BSN, RN

## 2022-10-20 NOTE — TELEPHONE ENCOUNTER
Requested Prescriptions   Pending Prescriptions Disp Refills     ISOtretinoin (ACCUTANE) 40 MG capsule 30 capsule 0     Si cap po daily with food       There is no refill protocol information for this order        Last office visit: 2022 with prescribing provider:  CARMEN BUI    Future Office Visit:   Next 5 appointments (look out 90 days)    Oct 20, 2022 11:30 AM  Lab visit with AN LAB  [facility] ([facility] ) [address] [facility] [address] [phone]               [facility]

## 2022-10-20 NOTE — LETTER
"    10/20/2022         RE: Kandice Mcgowan  [address] [facility] [address]        Dear Colleague,    Thank you for referring your patient, Kandice Mcgowan, to the [facility]. Please see a copy of my visit note below.    The patient has been notified of the following:      \"We have found that certain health care needs can be provided without the need for a face to face visit.  This service lets us provide the care you need with a phone conversation.       I will have full access to your [place] medical record during this entire phone call.   I will be taking notes for your medical record.      Since this is like an office visit, we will bill your insurance company for this service.       There are potential benefits and risks of telephone visits (e.g. limits to patient confidentiality) that differ from in-person visits.?  Confidentiality still applies for telephone services, and nobody will record the visit.  It is important to be in a quiet, private space that is free of distractions (including cell phone or other devices) during the visit.??      If during the course of the call I believe a telephone visit is not appropriate, you will not be charged for this service\"     Consent has been obtained for this service by care team member: Yes   HPI:   CC: Kandice Mcgowan is a pleasant 21 year old female who presents via video visit for recheck of acne s/p 1 month of accutane. She is doing well but flared at first. No adverse effects other than dryness.    Condition has been present for: years. She completed isotretinoin in 2019 and her acne has slowly returned since that time. It is on the face; chest and back have remained clear since isotretinoin.   Pt complains of pain: Yes       Current Outpatient Medications   Medication Sig Dispense Refill     desogestrel-ethinyl estradiol (APRI) 0.15-30 MG-MCG tablet Take 1 tablet by mouth daily 84 tablet 3     ibuprofen (ADVIL,MOTRIN) 100 MG " chewable tablet Take 400 mg by mouth every 8 hours as needed for fever       ISOtretinoin (ACCUTANE) 40 MG capsule 1 cap po daily with food 30 capsule 0     Loratadine (CLARITIN PO) Take  by mouth.       MIRALAX OR as needed       spironolactone (ALDACTONE) 100 MG tablet 1 tab po daily 90 tablet 3     spironolactone (ALDACTONE) 50 MG tablet 1 tab po daily 90 tablet 3     doxycycline monohydrate (MONODOX) 100 MG capsule 1 cap PO BID with food and full glass of water (Patient not taking: Reported on 9/22/2022) 60 capsule 0     tretinoin (RETIN-A) 0.025 % external cream Apply a pea size to entire face QD (Patient not taking: Reported on 10/20/2022) 45 g 11     Allergies   Allergen Reactions     Amoxicillin Rash     Augmentin Rash     Ceftin [Cefuroxime] Rash     Denies any other skin complaints, in general feels well: Yes  Review of symptoms otherwise negative:Yes    PHYSICAL EXAM:   A&Ox3: Yes   Well developed/well nourished female Yes   Mood appropriate Yes      There were no vitals taken for this visit.  Type 2 skin. Mood appropriate  Alert and Oriented X 3. Well developed, well nourished in no distress.  General appearance: Normal  Head including face: Normal  Eyes: conjunctiva and lids: Normal  Mouth: Lips, teeth, gums: Normal  Neck: Normal  Skin: Scalp and body hair: See below     Comedones Papules/Pustules Cysts Staining Scarring   Face/Neck 1-2+ 1-2+ 0 2-3+ 0   Chest 0 0 0 0 0   Back 0 0 0 0 0     Telangiectasias: No Fixed Erythema: No Exoriations: No   Other Physical Exam Findings:    ASSESSMENT & PLAN:     1. Acne Vulgaris - doing well. 2nd course. No issues other than dryness     Accutane is discussed fully with the patient. It is a very effective drug to treat acne vulgaris but has many significant side effects. Chief among these are teratogensis, hepatic injury, dyslipidemia and severe drying of the mucous membranes. All of these issues have been discussed in details. Monthly blood tests to monitor lipids  and liver functions will be necessary. Expect painful dryness and/or fissuring around the lips, eyes, and other moist areas of the body. Balms may be protective. Contact lens may be too painful to wear temporarily while on this drug. Episodes of significant depression have been reported, including suicidal ideation and attempts in rare cases. It may also cause pseudotumor cerebri and hyperostosis. The patient will report any such changes in mood, depressive symptoms or suicidal thoughts, headaches, joint or bone pains.    Female patients MUST use two simultaneous methods of family planning. Accutane is Category X for pregnancy, meaning it will cause fetal teratogenic malformations, and pregnancy MUST be avoided while on this drug.    The dose is 0.5-1 mg/kg in two divided doses for 15-20 weeks.    After discussion of these important issues, s/he indicates complete understanding of all of the above, and does wish to proceed with accutane therapy.    --iPlege: [id number]  --Standing labs  --Abstinence   --Continue spironolactone for now  --Start isotretinoin 60 mg daily month #2  --Total: 1200 mg  --Goal: 15420 - 19053 mg    Doximity  Start: 1309  End:1315  Location: [place]  Location of patient: home    Pt advised on use and risks including photosensitivity, allergic reactions, GI upset, headaches, nausea, erythema, scaling, vertigo, asthralgias, blood clots:Yes    Follow-up: 1 month  CC:   Scribed By: Gosia Chen, MS, KORY      Again, thank you for allowing me to participate in the care of your patient.        Sincerely,        Gosia Chen PA-C

## 2022-10-20 NOTE — TELEPHONE ENCOUNTER
Scheduled to be seen today. Note added to appt notes to not request med from pharmacy. Wanda Noe RN

## 2022-10-20 NOTE — PROGRESS NOTES
"The patient has been notified of the following:      \"We have found that certain health care needs can be provided without the need for a face to face visit.  This service lets us provide the care you need with a phone conversation.       I will have full access to your [place] medical record during this entire phone call.   I will be taking notes for your medical record.      Since this is like an office visit, we will bill your insurance company for this service.       There are potential benefits and risks of telephone visits (e.g. limits to patient confidentiality) that differ from in-person visits.?  Confidentiality still applies for telephone services, and nobody will record the visit.  It is important to be in a quiet, private space that is free of distractions (including cell phone or other devices) during the visit.??      If during the course of the call I believe a telephone visit is not appropriate, you will not be charged for this service\"     Consent has been obtained for this service by care team member: Yes   HPI:   CC: Kandice Mcgowan is a pleasant 21 year old female who presents via video visit for recheck of acne s/p 1 month of accutane. She is doing well but flared at first. No adverse effects other than dryness.    Condition has been present for: years. She completed isotretinoin in 2019 and her acne has slowly returned since that time. It is on the face; chest and back have remained clear since isotretinoin.   Pt complains of pain: Yes       Current Outpatient Medications   Medication Sig Dispense Refill     desogestrel-ethinyl estradiol (APRI) 0.15-30 MG-MCG tablet Take 1 tablet by mouth daily 84 tablet 3     ibuprofen (ADVIL,MOTRIN) 100 MG chewable tablet Take 400 mg by mouth every 8 hours as needed for fever       ISOtretinoin (ACCUTANE) 40 MG capsule 1 cap po daily with food 30 capsule 0     Loratadine (CLARITIN PO) Take  by mouth.       MIRALAX OR as needed       spironolactone " (ALDACTONE) 100 MG tablet 1 tab po daily 90 tablet 3     spironolactone (ALDACTONE) 50 MG tablet 1 tab po daily 90 tablet 3     doxycycline monohydrate (MONODOX) 100 MG capsule 1 cap PO BID with food and full glass of water (Patient not taking: Reported on 9/22/2022) 60 capsule 0     tretinoin (RETIN-A) 0.025 % external cream Apply a pea size to entire face QD (Patient not taking: Reported on 10/20/2022) 45 g 11     Allergies   Allergen Reactions     Amoxicillin Rash     Augmentin Rash     Ceftin [Cefuroxime] Rash     Denies any other skin complaints, in general feels well: Yes  Review of symptoms otherwise negative:Yes    PHYSICAL EXAM:   A&Ox3: Yes   Well developed/well nourished female Yes   Mood appropriate Yes      There were no vitals taken for this visit.  Type 2 skin. Mood appropriate  Alert and Oriented X 3. Well developed, well nourished in no distress.  General appearance: Normal  Head including face: Normal  Eyes: conjunctiva and lids: Normal  Mouth: Lips, teeth, gums: Normal  Neck: Normal  Skin: Scalp and body hair: See below     Comedones Papules/Pustules Cysts Staining Scarring   Face/Neck 1-2+ 1-2+ 0 2-3+ 0   Chest 0 0 0 0 0   Back 0 0 0 0 0     Telangiectasias: No Fixed Erythema: No Exoriations: No   Other Physical Exam Findings:    ASSESSMENT & PLAN:     1. Acne Vulgaris - doing well. 2nd course. No issues other than dryness     Accutane is discussed fully with the patient. It is a very effective drug to treat acne vulgaris but has many significant side effects. Chief among these are teratogensis, hepatic injury, dyslipidemia and severe drying of the mucous membranes. All of these issues have been discussed in details. Monthly blood tests to monitor lipids and liver functions will be necessary. Expect painful dryness and/or fissuring around the lips, eyes, and other moist areas of the body. Balms may be protective. Contact lens may be too painful to wear temporarily while on this drug. Episodes of  significant depression have been reported, including suicidal ideation and attempts in rare cases. It may also cause pseudotumor cerebri and hyperostosis. The patient will report any such changes in mood, depressive symptoms or suicidal thoughts, headaches, joint or bone pains.    Female patients MUST use two simultaneous methods of family planning. Accutane is Category X for pregnancy, meaning it will cause fetal teratogenic malformations, and pregnancy MUST be avoided while on this drug.    The dose is 0.5-1 mg/kg in two divided doses for 15-20 weeks.    After discussion of these important issues, s/he indicates complete understanding of all of the above, and does wish to proceed with accutane therapy.    --iPlege: [id number]  --Standing labs  --Abstinence   --Continue spironolactone for now  --Start isotretinoin 60 mg daily month #2  --Total: 1200 mg  --Goal: 27509 - 63434 mg    Doximity  Start: 1309  End:1315  Location: [place]  Location of patient: home    Pt advised on use and risks including photosensitivity, allergic reactions, GI upset, headaches, nausea, erythema, scaling, vertigo, asthralgias, blood clots:Yes    Follow-up: 1 month  CC:   Scribed By: Gosia Chen, MS, PAOTONIEL

## 2022-10-21 ENCOUNTER — LAB (OUTPATIENT)
Dept: LAB | Facility: CLINIC | Age: 22
End: 2022-10-21
Payer: COMMERCIAL

## 2022-10-21 DIAGNOSIS — Z51.81 THERAPEUTIC DRUG MONITORING: ICD-10-CM

## 2022-10-21 LAB — HCG UR QL: NEGATIVE

## 2022-10-21 PROCEDURE — 81025 URINE PREGNANCY TEST: CPT

## 2022-10-23 RX ORDER — ISOTRETINOIN 30 MG/1
CAPSULE ORAL
Qty: 60 CAPSULE | Refills: 0 | Status: SHIPPED | OUTPATIENT
Start: 2022-10-23 | End: 2023-12-07

## 2022-10-24 ENCOUNTER — TELEPHONE (OUTPATIENT)
Dept: DERMATOLOGY | Facility: CLINIC | Age: 22
End: 2022-10-24

## 2022-10-24 DIAGNOSIS — L70.0 ACNE VULGARIS: ICD-10-CM

## 2022-10-24 RX ORDER — ISOTRETINOIN 40 MG/1
CAPSULE ORAL
Qty: 30 CAPSULE | Refills: 0 | Status: CANCELLED | OUTPATIENT
Start: 2022-10-24

## 2022-10-24 NOTE — TELEPHONE ENCOUNTER
Patient dose was switched to 30 mg twice daily for 2nd month and I tried to reach patient but she did not answer. I called  [facility] pharmacy and they said rx is in packing process.     I will try and send patient a My chart message to be sure she has answered her 3 questions as ipledge shows she is qualified to receive drug. \      I sent patient a detailed My chart message.    Wanda Noe RN

## 2022-10-24 NOTE — TELEPHONE ENCOUNTER
Requested Prescriptions   Pending Prescriptions Disp Refills     ISOtretinoin (ACCUTANE) 40 MG capsule 30 capsule 0     Si cap po daily with food       There is no refill protocol information for this order        Last office visit: 2022 with prescribing provider:  CARMEN BUI    Future Office Visit:   Next 5 appointments (look out 90 days)    2022 12:30 PM  Lab visit with AN LAB  [facility] ([facility] ) [address] [facility] [address] [phone]               [facility]  [facility]

## 2022-10-25 NOTE — TELEPHONE ENCOUNTER
I called  Specialty pharmacy and they are set  to deliver medication on their end. Wanda Noe RN  .

## 2022-11-13 ENCOUNTER — LAB (OUTPATIENT)
Dept: LAB | Facility: CLINIC | Age: 22
End: 2022-11-13
Payer: COMMERCIAL

## 2022-11-13 DIAGNOSIS — Z51.81 THERAPEUTIC DRUG MONITORING: ICD-10-CM

## 2022-11-13 LAB
ERYTHROCYTE [DISTWIDTH] IN BLOOD BY AUTOMATED COUNT: 13.6 % (ref 10–15)
HCG UR QL: NEGATIVE
HCT VFR BLD AUTO: 42.1 % (ref 35–47)
HGB BLD-MCNC: 13.9 G/DL (ref 11.7–15.7)
MCH RBC QN AUTO: 27.2 PG (ref 26.5–33)
MCHC RBC AUTO-ENTMCNC: 33 G/DL (ref 31.5–36.5)
MCV RBC AUTO: 82 FL (ref 78–100)
PLATELET # BLD AUTO: 433 10E3/UL (ref 150–450)
RBC # BLD AUTO: 5.11 10E6/UL (ref 3.8–5.2)
WBC # BLD AUTO: 6.8 10E3/UL (ref 4–11)

## 2022-11-13 PROCEDURE — 85027 COMPLETE CBC AUTOMATED: CPT

## 2022-11-13 PROCEDURE — 80061 LIPID PANEL: CPT

## 2022-11-13 PROCEDURE — 81025 URINE PREGNANCY TEST: CPT

## 2022-11-13 PROCEDURE — 80053 COMPREHEN METABOLIC PANEL: CPT

## 2022-11-13 PROCEDURE — 36415 COLL VENOUS BLD VENIPUNCTURE: CPT

## 2022-11-14 ENCOUNTER — VIRTUAL VISIT (OUTPATIENT)
Dept: DERMATOLOGY | Facility: CLINIC | Age: 22
End: 2022-11-14
Payer: COMMERCIAL

## 2022-11-14 DIAGNOSIS — L70.0 ACNE VULGARIS: Primary | ICD-10-CM

## 2022-11-14 DIAGNOSIS — Z51.81 THERAPEUTIC DRUG MONITORING: ICD-10-CM

## 2022-11-14 LAB
ALBUMIN SERPL-MCNC: 3.6 G/DL (ref 3.4–5)
ALP SERPL-CCNC: 70 U/L (ref 40–150)
ALT SERPL W P-5'-P-CCNC: 26 U/L (ref 0–50)
ANION GAP SERPL CALCULATED.3IONS-SCNC: 6 MMOL/L (ref 3–14)
AST SERPL W P-5'-P-CCNC: 18 U/L (ref 0–35)
BILIRUB SERPL-MCNC: 0.6 MG/DL (ref 0.2–1.3)
BUN SERPL-MCNC: 8 MG/DL (ref 7–30)
CALCIUM SERPL-MCNC: 9.2 MG/DL (ref 8.5–10.1)
CHLORIDE BLD-SCNC: 110 MMOL/L (ref 96–110)
CHOLEST SERPL-MCNC: 214 MG/DL
CO2 SERPL-SCNC: 24 MMOL/L (ref 20–32)
CREAT SERPL-MCNC: 0.59 MG/DL (ref 0.5–1)
FASTING STATUS PATIENT QL REPORTED: NO
GFR SERPL CREATININE-BSD FRML MDRD: >90 ML/MIN/1.73M2
GLUCOSE BLD-MCNC: 110 MG/DL (ref 70–99)
HDLC SERPL-MCNC: 57 MG/DL
LDLC SERPL CALC-MCNC: 125 MG/DL
NONHDLC SERPL-MCNC: 157 MG/DL
POTASSIUM BLD-SCNC: 4 MMOL/L (ref 3.4–5.3)
PROT SERPL-MCNC: 7.7 G/DL (ref 6.8–8.8)
SODIUM SERPL-SCNC: 140 MMOL/L (ref 133–144)
TRIGL SERPL-MCNC: 162 MG/DL

## 2022-11-14 PROCEDURE — 99214 OFFICE O/P EST MOD 30 MIN: CPT | Mod: 95 | Performed by: PHYSICIAN ASSISTANT

## 2022-11-14 RX ORDER — ISOTRETINOIN 40 MG/1
CAPSULE ORAL
Qty: 60 CAPSULE | Refills: 0 | Status: SHIPPED | OUTPATIENT
Start: 2022-11-14 | End: 2022-12-19

## 2022-11-14 NOTE — PROGRESS NOTES
"The patient has been notified of the following:      \"We have found that certain health care needs can be provided without the need for a face to face visit.  This service lets us provide the care you need with a phone conversation.       I will have full access to your [place] medical record during this entire phone call.   I will be taking notes for your medical record.      Since this is like an office visit, we will bill your insurance company for this service.       There are potential benefits and risks of telephone visits (e.g. limits to patient confidentiality) that differ from in-person visits.?  Confidentiality still applies for telephone services, and nobody will record the visit.  It is important to be in a quiet, private space that is free of distractions (including cell phone or other devices) during the visit.??      If during the course of the call I believe a telephone visit is not appropriate, you will not be charged for this service\"     Consent has been obtained for this service by care team member: Yes   HPI:   CC: Kandice Mcgowan is a pleasant 21 year old female who presents via video visit for recheck of acne s/p 2 months of accutane. She is doing well but is still making new acne. No adverse effects other than dryness.  She is abstinent.   Condition has been present for: years. She completed isotretinoin in 2019 and her acne has slowly returned since that time. It is on the face; chest and back have remained clear since isotretinoin.   Pt complains of pain: Yes       Current Outpatient Medications   Medication Sig Dispense Refill     desogestrel-ethinyl estradiol (APRI) 0.15-30 MG-MCG tablet Take 1 tablet by mouth daily 84 tablet 3     ibuprofen (ADVIL,MOTRIN) 100 MG chewable tablet Take 400 mg by mouth every 8 hours as needed for fever       ISOtretinoin (ABSORICA) 30 MG capsule 1 cap po bid with food 60 capsule 0     Loratadine (CLARITIN PO) Take  by mouth.       MIRALAX OR as needed   "     ISOtretinoin (ACCUTANE) 40 MG capsule 1 cap po daily with food (Patient not taking: Reported on 11/14/2022) 30 capsule 0     spironolactone (ALDACTONE) 100 MG tablet 1 tab po daily (Patient not taking: Reported on 11/14/2022) 90 tablet 3     spironolactone (ALDACTONE) 50 MG tablet 1 tab po daily (Patient not taking: Reported on 11/14/2022) 90 tablet 3     Allergies   Allergen Reactions     Amoxicillin Rash     Augmentin Rash     Ceftin [Cefuroxime] Rash     Denies any other skin complaints, in general feels well: Yes  Review of symptoms otherwise negative:Yes    PHYSICAL EXAM:   A&Ox3: Yes   Well developed/well nourished female Yes   Mood appropriate Yes      There were no vitals taken for this visit.  Type 2 skin. Mood appropriate  Alert and Oriented X 3. Well developed, well nourished in no distress.  General appearance: Normal  Head including face: Normal  Eyes: conjunctiva and lids: Normal  Mouth: Lips, teeth, gums: Normal  Neck: Normal  Skin: Scalp and body hair: See below     Comedones Papules/Pustules Cysts Staining Scarring   Face/Neck 1-2+ 1-2+ 0 2+ 0   Chest 0 0 0 0 0   Back 0 0 0 0 0     Telangiectasias: No Fixed Erythema: No Exoriations: No   Other Physical Exam Findings:    ASSESSMENT & PLAN:     1. Acne Vulgaris - doing well. 2nd course. No issues other than dryness     Accutane is discussed fully with the patient. It is a very effective drug to treat acne vulgaris but has many significant side effects. Chief among these are teratogensis, hepatic injury, dyslipidemia and severe drying of the mucous membranes. All of these issues have been discussed in details. Monthly blood tests to monitor lipids and liver functions will be necessary. Expect painful dryness and/or fissuring around the lips, eyes, and other moist areas of the body. Balms may be protective. Contact lens may be too painful to wear temporarily while on this drug. Episodes of significant depression have been reported, including  suicidal ideation and attempts in rare cases. It may also cause pseudotumor cerebri and hyperostosis. The patient will report any such changes in mood, depressive symptoms or suicidal thoughts, headaches, joint or bone pains.    Female patients MUST use two simultaneous methods of family planning. Accutane is Category X for pregnancy, meaning it will cause fetal teratogenic malformations, and pregnancy MUST be avoided while on this drug.    The dose is 0.5-1 mg/kg in two divided doses for 15-20 weeks.    After discussion of these important issues, s/he indicates complete understanding of all of the above, and does wish to proceed with accutane therapy.    --iPlege: [id number]  --Standing labs  --Abstinence   --Continue spironolactone for now  --Start isotretinoin 80 mg daily month #3  --Total: 3000 mg  --Goal: 08228 - 81047 mg    Doximity  Start: 0927  End:0935  Location: [place]  Location of patient: home    Pt advised on use and risks including photosensitivity, allergic reactions, GI upset, headaches, nausea, erythema, scaling, vertigo, asthralgias, blood clots:Yes    Follow-up: 1 month  CC:   Scribed By: Gosia Chen, MS, PAOTONIEL

## 2022-11-14 NOTE — LETTER
"    11/14/2022         RE: Kandice Mcgowan  [address] [place] [address]        Dear Colleague,    Thank you for referring your patient, Kandice Mcgowan, to the [facility]. Please see a copy of my visit note below.    Accutane follow up, a few break outs.    The patient has been notified of the following:      \"We have found that certain health care needs can be provided without the need for a face to face visit.  This service lets us provide the care you need with a phone conversation.       I will have full access to your [place] medical record during this entire phone call.   I will be taking notes for your medical record.      Since this is like an office visit, we will bill your insurance company for this service.       There are potential benefits and risks of telephone visits (e.g. limits to patient confidentiality) that differ from in-person visits.?  Confidentiality still applies for telephone services, and nobody will record the visit.  It is important to be in a quiet, private space that is free of distractions (including cell phone or other devices) during the visit.??      If during the course of the call I believe a telephone visit is not appropriate, you will not be charged for this service\"     Consent has been obtained for this service by care team member: Yes   HPI:   CC: Kandice Mcgowan is a pleasant 21 year old female who presents via video visit for recheck of acne s/p 2 months of accutane. She is doing well but is still making new acne. No adverse effects other than dryness.  She is abstinent.   Condition has been present for: years. She completed isotretinoin in 2019 and her acne has slowly returned since that time. It is on the face; chest and back have remained clear since isotretinoin.   Pt complains of pain: Yes       Current Outpatient Medications   Medication Sig Dispense Refill     desogestrel-ethinyl estradiol (APRI) 0.15-30 MG-MCG tablet Take 1 tablet " by mouth daily 84 tablet 3     ibuprofen (ADVIL,MOTRIN) 100 MG chewable tablet Take 400 mg by mouth every 8 hours as needed for fever       ISOtretinoin (ABSORICA) 30 MG capsule 1 cap po bid with food 60 capsule 0     Loratadine (CLARITIN PO) Take  by mouth.       MIRALAX OR as needed       ISOtretinoin (ACCUTANE) 40 MG capsule 1 cap po daily with food (Patient not taking: Reported on 11/14/2022) 30 capsule 0     spironolactone (ALDACTONE) 100 MG tablet 1 tab po daily (Patient not taking: Reported on 11/14/2022) 90 tablet 3     spironolactone (ALDACTONE) 50 MG tablet 1 tab po daily (Patient not taking: Reported on 11/14/2022) 90 tablet 3     Allergies   Allergen Reactions     Amoxicillin Rash     Augmentin Rash     Ceftin [Cefuroxime] Rash     Denies any other skin complaints, in general feels well: Yes  Review of symptoms otherwise negative:Yes    PHYSICAL EXAM:   A&Ox3: Yes   Well developed/well nourished female Yes   Mood appropriate Yes      There were no vitals taken for this visit.  Type 2 skin. Mood appropriate  Alert and Oriented X 3. Well developed, well nourished in no distress.  General appearance: Normal  Head including face: Normal  Eyes: conjunctiva and lids: Normal  Mouth: Lips, teeth, gums: Normal  Neck: Normal  Skin: Scalp and body hair: See below     Comedones Papules/Pustules Cysts Staining Scarring   Face/Neck 1-2+ 1-2+ 0 2+ 0   Chest 0 0 0 0 0   Back 0 0 0 0 0     Telangiectasias: No Fixed Erythema: No Exoriations: No   Other Physical Exam Findings:    ASSESSMENT & PLAN:     1. Acne Vulgaris - doing well. 2nd course. No issues other than dryness     Accutane is discussed fully with the patient. It is a very effective drug to treat acne vulgaris but has many significant side effects. Chief among these are teratogensis, hepatic injury, dyslipidemia and severe drying of the mucous membranes. All of these issues have been discussed in details. Monthly blood tests to monitor lipids and liver  functions will be necessary. Expect painful dryness and/or fissuring around the lips, eyes, and other moist areas of the body. Balms may be protective. Contact lens may be too painful to wear temporarily while on this drug. Episodes of significant depression have been reported, including suicidal ideation and attempts in rare cases. It may also cause pseudotumor cerebri and hyperostosis. The patient will report any such changes in mood, depressive symptoms or suicidal thoughts, headaches, joint or bone pains.    Female patients MUST use two simultaneous methods of family planning. Accutane is Category X for pregnancy, meaning it will cause fetal teratogenic malformations, and pregnancy MUST be avoided while on this drug.    The dose is 0.5-1 mg/kg in two divided doses for 15-20 weeks.    After discussion of these important issues, s/he indicates complete understanding of all of the above, and does wish to proceed with accutane therapy.    --iPlege: [id number]  --Standing labs  --Abstinence   --Continue spironolactone for now  --Start isotretinoin 80 mg daily month #3  --Total: 3000 mg  --Goal: 08149 - 90086 mg    Doximity  Start: 0927  End:0935  Location: [place]  Location of patient: home    Pt advised on use and risks including photosensitivity, allergic reactions, GI upset, headaches, nausea, erythema, scaling, vertigo, asthralgias, blood clots:Yes    Follow-up: 1 month  CC:   Scribed By: Gosia Chen, MS, KORY      Again, thank you for allowing me to participate in the care of your patient.        Sincerely,        Gosia Chen PA-C

## 2022-11-28 DIAGNOSIS — L70.0 ACNE VULGARIS: ICD-10-CM

## 2022-11-28 NOTE — TELEPHONE ENCOUNTER
Requested Prescriptions   Pending Prescriptions Disp Refills     desogestrel-ethinyl estradiol (APRI) 0.15-30 MG-MCG tablet 84 tablet 3     Sig: Take 1 tablet by mouth daily       There is no refill protocol information for this order        Last office visit: 8/11/2022 with prescribing provider:  CARMEN BUI    Future Office Visit:   Next 5 appointments (look out 90 days)    Dec 19, 2022  8:45 AM  Lab visit with AN LAB  [facility] ([facility] ) [address] [facility] [address] [phone]               [facility]

## 2022-11-29 RX ORDER — DESOGESTREL AND ETHINYL ESTRADIOL 0.15-0.03
1 KIT ORAL DAILY
Qty: 84 TABLET | Refills: 3 | Status: SHIPPED | OUTPATIENT
Start: 2022-11-29 | End: 2023-10-27

## 2022-12-15 DIAGNOSIS — L70.0 ACNE VULGARIS: ICD-10-CM

## 2022-12-15 RX ORDER — SPIRONOLACTONE 100 MG/1
TABLET, FILM COATED ORAL
Qty: 90 TABLET | Refills: 3 | Status: SHIPPED | OUTPATIENT
Start: 2022-12-15 | End: 2023-12-07

## 2022-12-15 NOTE — TELEPHONE ENCOUNTER
Requested Prescriptions   Pending Prescriptions Disp Refills     spironolactone (ALDACTONE) 100 MG tablet 90 tablet 3     Si tab po daily       There is no refill protocol information for this order        Last office visit: 2022 with prescribing provider:  CARMEN BUI    Future Office Visit:   Next 5 appointments (look out 90 days)    Dec 19, 2022  8:45 AM  Lab visit with AN LAB  [facility] ([facility] ) [address] [facility] [address] [phone]               [facility]  [facility]

## 2022-12-15 NOTE — TELEPHONE ENCOUNTER
Looks like she is back on Isotretinoin. Is she to continue or stop Spironolactone while on Isotretinoin?    Please advise. Wanda Noe RN

## 2022-12-19 ENCOUNTER — LAB (OUTPATIENT)
Dept: LAB | Facility: CLINIC | Age: 22
End: 2022-12-19
Payer: COMMERCIAL

## 2022-12-19 ENCOUNTER — VIRTUAL VISIT (OUTPATIENT)
Dept: DERMATOLOGY | Facility: CLINIC | Age: 22
End: 2022-12-19
Payer: COMMERCIAL

## 2022-12-19 DIAGNOSIS — L70.0 ACNE VULGARIS: ICD-10-CM

## 2022-12-19 DIAGNOSIS — Z51.81 THERAPEUTIC DRUG MONITORING: Primary | ICD-10-CM

## 2022-12-19 DIAGNOSIS — Z51.81 THERAPEUTIC DRUG MONITORING: ICD-10-CM

## 2022-12-19 LAB
ALBUMIN SERPL-MCNC: 3.2 G/DL (ref 3.4–5)
ALP SERPL-CCNC: 67 U/L (ref 40–150)
ALT SERPL W P-5'-P-CCNC: 26 U/L (ref 0–50)
ANION GAP SERPL CALCULATED.3IONS-SCNC: 9 MMOL/L (ref 3–14)
AST SERPL W P-5'-P-CCNC: 16 U/L (ref 0–45)
BILIRUB SERPL-MCNC: 0.2 MG/DL (ref 0.2–1.3)
BUN SERPL-MCNC: 10 MG/DL (ref 7–30)
CALCIUM SERPL-MCNC: 9.6 MG/DL (ref 8.5–10.1)
CHLORIDE BLD-SCNC: 106 MMOL/L (ref 94–109)
CHOLEST SERPL-MCNC: 180 MG/DL
CO2 SERPL-SCNC: 22 MMOL/L (ref 20–32)
CREAT SERPL-MCNC: 0.58 MG/DL (ref 0.52–1.04)
ERYTHROCYTE [DISTWIDTH] IN BLOOD BY AUTOMATED COUNT: 13.4 % (ref 10–15)
FASTING STATUS PATIENT QL REPORTED: YES
GFR SERPL CREATININE-BSD FRML MDRD: >90 ML/MIN/1.73M2
GLUCOSE BLD-MCNC: 92 MG/DL (ref 70–99)
HCG UR QL: NEGATIVE
HCT VFR BLD AUTO: 39.8 % (ref 35–47)
HDLC SERPL-MCNC: 54 MG/DL
HGB BLD-MCNC: 13.2 G/DL (ref 11.7–15.7)
LDLC SERPL CALC-MCNC: 80 MG/DL
MCH RBC QN AUTO: 27.4 PG (ref 26.5–33)
MCHC RBC AUTO-ENTMCNC: 33.2 G/DL (ref 31.5–36.5)
MCV RBC AUTO: 83 FL (ref 78–100)
NONHDLC SERPL-MCNC: 126 MG/DL
PLATELET # BLD AUTO: 410 10E3/UL (ref 150–450)
POTASSIUM BLD-SCNC: 3.9 MMOL/L (ref 3.4–5.3)
PROT SERPL-MCNC: 7.7 G/DL (ref 6.8–8.8)
RBC # BLD AUTO: 4.81 10E6/UL (ref 3.8–5.2)
SODIUM SERPL-SCNC: 137 MMOL/L (ref 133–144)
TRIGL SERPL-MCNC: 231 MG/DL
WBC # BLD AUTO: 7.6 10E3/UL (ref 4–11)

## 2022-12-19 PROCEDURE — 99214 OFFICE O/P EST MOD 30 MIN: CPT | Mod: 95 | Performed by: PHYSICIAN ASSISTANT

## 2022-12-19 PROCEDURE — 85027 COMPLETE CBC AUTOMATED: CPT

## 2022-12-19 PROCEDURE — 81025 URINE PREGNANCY TEST: CPT

## 2022-12-19 PROCEDURE — 80053 COMPREHEN METABOLIC PANEL: CPT

## 2022-12-19 PROCEDURE — 80061 LIPID PANEL: CPT

## 2022-12-19 PROCEDURE — 36415 COLL VENOUS BLD VENIPUNCTURE: CPT

## 2022-12-19 RX ORDER — ISOTRETINOIN 40 MG/1
CAPSULE ORAL
Qty: 60 CAPSULE | Refills: 0 | Status: SHIPPED | OUTPATIENT
Start: 2022-12-19 | End: 2023-01-19

## 2022-12-19 NOTE — PROGRESS NOTES
"The patient has been notified of the following:      \"We have found that certain health care needs can be provided without the need for a face to face visit.  This service lets us provide the care you need with a phone conversation.       I will have full access to your [place] medical record during this entire phone call.   I will be taking notes for your medical record.      Since this is like an office visit, we will bill your insurance company for this service.       There are potential benefits and risks of telephone visits (e.g. limits to patient confidentiality) that differ from in-person visits.?  Confidentiality still applies for telephone services, and nobody will record the visit.  It is important to be in a quiet, private space that is free of distractions (including cell phone or other devices) during the visit.??      If during the course of the call I believe a telephone visit is not appropriate, you will not be charged for this service\"     Consent has been obtained for this service by care team member: Yes   HPI:   CC: Kandice Mcgowan is a pleasant 22 year old female who presents via telephone visit with uploaded photos for recheck of acne s/p 3 months of accutane. She only had one new pimple this past month. No adverse effects other than dryness.  She is abstinent.   Condition has been present for: years. She completed isotretinoin in 2019 and her acne has slowly returned since that time. It is on the face; chest and back have remained clear since isotretinoin.   Pt complains of pain: Yes       Current Outpatient Medications   Medication Sig Dispense Refill     desogestrel-ethinyl estradiol (APRI) 0.15-30 MG-MCG tablet Take 1 tablet by mouth daily 84 tablet 3     ibuprofen (ADVIL,MOTRIN) 100 MG chewable tablet Take 400 mg by mouth every 8 hours as needed for fever       ISOtretinoin (ACCUTANE) 40 MG capsule 1 cap po bid with food 60 capsule 0     Loratadine (CLARITIN PO) Take  by mouth.       " MIRALAX OR as needed       ISOtretinoin (ABSORICA) 30 MG capsule 1 cap po bid with food (Patient not taking: Reported on 12/19/2022) 60 capsule 0     ISOtretinoin (ACCUTANE) 40 MG capsule 1 cap po daily with food (Patient not taking: Reported on 11/14/2022) 30 capsule 0     spironolactone (ALDACTONE) 100 MG tablet 1 tab po daily (Patient not taking: Reported on 12/19/2022) 90 tablet 3     spironolactone (ALDACTONE) 50 MG tablet 1 tab po daily (Patient not taking: Reported on 11/14/2022) 90 tablet 3     Allergies   Allergen Reactions     Amoxicillin Rash     Augmentin Rash     Ceftin [Cefuroxime] Rash     Denies any other skin complaints, in general feels well: Yes  Review of symptoms otherwise negative:Yes    PHYSICAL EXAM:   A&Ox3: Yes   Well developed/well nourished female Yes   Mood appropriate Yes      There were no vitals taken for this visit.  Type 2 skin. Mood appropriate  Alert and Oriented X 3. Well developed, well nourished in no distress.  General appearance: Normal  Head including face: Normal  Eyes: conjunctiva and lids: Normal  Mouth: Lips, teeth, gums: Normal  Neck: Normal  Skin: Scalp and body hair: See below     Comedones Papules/Pustules Cysts Staining Scarring   Face/Neck 1-2+ 1-2+ 0 2+ 0   Chest 0 0 0 0 0   Back 0 0 0 0 0     Telangiectasias: No Fixed Erythema: No Exoriations: No   Other Physical Exam Findings:    ASSESSMENT & PLAN:     1. Acne Vulgaris - doing well. 2nd course. No issues other than dryness     Accutane is discussed fully with the patient. It is a very effective drug to treat acne vulgaris but has many significant side effects. Chief among these are teratogensis, hepatic injury, dyslipidemia and severe drying of the mucous membranes. All of these issues have been discussed in details. Monthly blood tests to monitor lipids and liver functions will be necessary. Expect painful dryness and/or fissuring around the lips, eyes, and other moist areas of the body. Balms may be  protective. Contact lens may be too painful to wear temporarily while on this drug. Episodes of significant depression have been reported, including suicidal ideation and attempts in rare cases. It may also cause pseudotumor cerebri and hyperostosis. The patient will report any such changes in mood, depressive symptoms or suicidal thoughts, headaches, joint or bone pains.    Female patients MUST use two simultaneous methods of family planning. Accutane is Category X for pregnancy, meaning it will cause fetal teratogenic malformations, and pregnancy MUST be avoided while on this drug.    The dose is 0.5-1 mg/kg in two divided doses for 15-20 weeks.    After discussion of these important issues, s/he indicates complete understanding of all of the above, and does wish to proceed with accutane therapy.    --iPlege: [id number]  --Standing labs  --Abstinence   --Continue spironolactone for now  --Start isotretinoin 80 mg daily month #4  --Total: 5400 mg  --Goal: 12537 - 70737 mg    Doximity  Start: 1128  End:1134  Location: [place]  Location of patient: home    Pt advised on use and risks including photosensitivity, allergic reactions, GI upset, headaches, nausea, erythema, scaling, vertigo, asthralgias, blood clots:Yes    Follow-up: 1 month  CC:   Scribed By: Gosia Chen MS, PAOTONIEL

## 2022-12-19 NOTE — LETTER
"    12/19/2022         RE: Kandice Mcgowan  [address] [address]        Dear Colleague,    Thank you for referring your patient, Kandice Mcgowan, to the [facility]. Please see a copy of my visit note below.    The patient has been notified of the following:      \"We have found that certain health care needs can be provided without the need for a face to face visit.  This service lets us provide the care you need with a phone conversation.       I will have full access to your [place] medical record during this entire phone call.   I will be taking notes for your medical record.      Since this is like an office visit, we will bill your insurance company for this service.       There are potential benefits and risks of telephone visits (e.g. limits to patient confidentiality) that differ from in-person visits.?  Confidentiality still applies for telephone services, and nobody will record the visit.  It is important to be in a quiet, private space that is free of distractions (including cell phone or other devices) during the visit.??      If during the course of the call I believe a telephone visit is not appropriate, you will not be charged for this service\"     Consent has been obtained for this service by care team member: Yes   HPI:   CC: Kandice Mcgowan is a pleasant 22 year old female who presents via telephone visit with uploaded photos for recheck of acne s/p 3 months of accutane. She only had one new pimple this past month. No adverse effects other than dryness.  She is abstinent.   Condition has been present for: years. She completed isotretinoin in 2019 and her acne has slowly returned since that time. It is on the face; chest and back have remained clear since isotretinoin.   Pt complains of pain: Yes       Current Outpatient Medications   Medication Sig Dispense Refill     desogestrel-ethinyl estradiol (APRI) 0.15-30 MG-MCG tablet Take 1 tablet by mouth daily 84 " tablet 3     ibuprofen (ADVIL,MOTRIN) 100 MG chewable tablet Take 400 mg by mouth every 8 hours as needed for fever       ISOtretinoin (ACCUTANE) 40 MG capsule 1 cap po bid with food 60 capsule 0     Loratadine (CLARITIN PO) Take  by mouth.       MIRALAX OR as needed       ISOtretinoin (ABSORICA) 30 MG capsule 1 cap po bid with food (Patient not taking: Reported on 12/19/2022) 60 capsule 0     ISOtretinoin (ACCUTANE) 40 MG capsule 1 cap po daily with food (Patient not taking: Reported on 11/14/2022) 30 capsule 0     spironolactone (ALDACTONE) 100 MG tablet 1 tab po daily (Patient not taking: Reported on 12/19/2022) 90 tablet 3     spironolactone (ALDACTONE) 50 MG tablet 1 tab po daily (Patient not taking: Reported on 11/14/2022) 90 tablet 3     Allergies   Allergen Reactions     Amoxicillin Rash     Augmentin Rash     Ceftin [Cefuroxime] Rash     Denies any other skin complaints, in general feels well: Yes  Review of symptoms otherwise negative:Yes    PHYSICAL EXAM:   A&Ox3: Yes   Well developed/well nourished female Yes   Mood appropriate Yes      There were no vitals taken for this visit.  Type 2 skin. Mood appropriate  Alert and Oriented X 3. Well developed, well nourished in no distress.  General appearance: Normal  Head including face: Normal  Eyes: conjunctiva and lids: Normal  Mouth: Lips, teeth, gums: Normal  Neck: Normal  Skin: Scalp and body hair: See below     Comedones Papules/Pustules Cysts Staining Scarring   Face/Neck 1-2+ 1-2+ 0 2+ 0   Chest 0 0 0 0 0   Back 0 0 0 0 0     Telangiectasias: No Fixed Erythema: No Exoriations: No   Other Physical Exam Findings:    ASSESSMENT & PLAN:     1. Acne Vulgaris - doing well. 2nd course. No issues other than dryness     Accutane is discussed fully with the patient. It is a very effective drug to treat acne vulgaris but has many significant side effects. Chief among these are teratogensis, hepatic injury, dyslipidemia and severe drying of the mucous membranes. All  of these issues have been discussed in details. Monthly blood tests to monitor lipids and liver functions will be necessary. Expect painful dryness and/or fissuring around the lips, eyes, and other moist areas of the body. Balms may be protective. Contact lens may be too painful to wear temporarily while on this drug. Episodes of significant depression have been reported, including suicidal ideation and attempts in rare cases. It may also cause pseudotumor cerebri and hyperostosis. The patient will report any such changes in mood, depressive symptoms or suicidal thoughts, headaches, joint or bone pains.    Female patients MUST use two simultaneous methods of family planning. Accutane is Category X for pregnancy, meaning it will cause fetal teratogenic malformations, and pregnancy MUST be avoided while on this drug.    The dose is 0.5-1 mg/kg in two divided doses for 15-20 weeks.    After discussion of these important issues, s/he indicates complete understanding of all of the above, and does wish to proceed with accutane therapy.    --iPlege: [id number]  --Standing labs  --Abstinence   --Continue spironolactone for now  --Start isotretinoin 80 mg daily month #4  --Total: 5400 mg  --Goal: 07249 - 82362 mg    Doximity  Start: 1128  End:1134  Location: [place]  Location of patient: home    Pt advised on use and risks including photosensitivity, allergic reactions, GI upset, headaches, nausea, erythema, scaling, vertigo, asthralgias, blood clots:Yes    Follow-up: 1 month  CC:   Scribed By: Gosia Chen, MS, PAOTONIEL      Again, thank you for allowing me to participate in the care of your patient.        Sincerely,        Gosia Chen PA-C

## 2023-01-07 ENCOUNTER — HEALTH MAINTENANCE LETTER (OUTPATIENT)
Age: 23
End: 2023-01-07

## 2023-01-18 ENCOUNTER — LAB (OUTPATIENT)
Dept: LAB | Facility: OTHER | Age: 23
End: 2023-01-18
Payer: COMMERCIAL

## 2023-01-18 DIAGNOSIS — Z51.81 THERAPEUTIC DRUG MONITORING: ICD-10-CM

## 2023-01-18 LAB
ALBUMIN SERPL BCG-MCNC: 4 G/DL (ref 3.5–5.2)
ALP SERPL-CCNC: 65 U/L (ref 35–104)
ALT SERPL W P-5'-P-CCNC: 19 U/L (ref 10–35)
ANION GAP SERPL CALCULATED.3IONS-SCNC: 12 MMOL/L (ref 7–15)
AST SERPL W P-5'-P-CCNC: 20 U/L (ref 10–35)
BILIRUB SERPL-MCNC: 0.4 MG/DL
BUN SERPL-MCNC: 8.5 MG/DL (ref 6–20)
CALCIUM SERPL-MCNC: 9.6 MG/DL (ref 8.6–10)
CHLORIDE SERPL-SCNC: 102 MMOL/L (ref 98–107)
CHOLEST SERPL-MCNC: 206 MG/DL
CREAT SERPL-MCNC: 0.63 MG/DL (ref 0.51–0.95)
DEPRECATED HCO3 PLAS-SCNC: 25 MMOL/L (ref 22–29)
ERYTHROCYTE [DISTWIDTH] IN BLOOD BY AUTOMATED COUNT: 13.8 % (ref 10–15)
GFR SERPL CREATININE-BSD FRML MDRD: >90 ML/MIN/1.73M2
GLUCOSE SERPL-MCNC: 87 MG/DL (ref 70–99)
HCG UR QL: NEGATIVE
HCT VFR BLD AUTO: 40.6 % (ref 35–47)
HDLC SERPL-MCNC: 58 MG/DL
HGB BLD-MCNC: 13.3 G/DL (ref 11.7–15.7)
LDLC SERPL CALC-MCNC: 116 MG/DL
MCH RBC QN AUTO: 27 PG (ref 26.5–33)
MCHC RBC AUTO-ENTMCNC: 32.8 G/DL (ref 31.5–36.5)
MCV RBC AUTO: 83 FL (ref 78–100)
NONHDLC SERPL-MCNC: 148 MG/DL
PLATELET # BLD AUTO: 434 10E3/UL (ref 150–450)
POTASSIUM SERPL-SCNC: 3.8 MMOL/L (ref 3.4–5.3)
PROT SERPL-MCNC: 7.3 G/DL (ref 6.4–8.3)
RBC # BLD AUTO: 4.92 10E6/UL (ref 3.8–5.2)
SODIUM SERPL-SCNC: 139 MMOL/L (ref 136–145)
TRIGL SERPL-MCNC: 161 MG/DL
WBC # BLD AUTO: 7.4 10E3/UL (ref 4–11)

## 2023-01-18 PROCEDURE — 81025 URINE PREGNANCY TEST: CPT

## 2023-01-18 PROCEDURE — 80053 COMPREHEN METABOLIC PANEL: CPT

## 2023-01-18 PROCEDURE — 80061 LIPID PANEL: CPT

## 2023-01-18 PROCEDURE — 36415 COLL VENOUS BLD VENIPUNCTURE: CPT

## 2023-01-18 PROCEDURE — 85027 COMPLETE CBC AUTOMATED: CPT

## 2023-01-19 ENCOUNTER — VIRTUAL VISIT (OUTPATIENT)
Dept: DERMATOLOGY | Facility: CLINIC | Age: 23
End: 2023-01-19
Payer: COMMERCIAL

## 2023-01-19 DIAGNOSIS — L70.0 ACNE VULGARIS: ICD-10-CM

## 2023-01-19 DIAGNOSIS — Z51.81 THERAPEUTIC DRUG MONITORING: Primary | ICD-10-CM

## 2023-01-19 PROCEDURE — 99214 OFFICE O/P EST MOD 30 MIN: CPT | Mod: 95 | Performed by: PHYSICIAN ASSISTANT

## 2023-01-19 RX ORDER — SPIRONOLACTONE 50 MG/1
TABLET, FILM COATED ORAL
Qty: 90 TABLET | Refills: 3 | Status: SHIPPED | OUTPATIENT
Start: 2023-01-19 | End: 2023-12-07 | Stop reason: DRUGHIGH

## 2023-01-19 RX ORDER — ISOTRETINOIN 40 MG/1
CAPSULE ORAL
Qty: 60 CAPSULE | Refills: 0 | Status: SHIPPED | OUTPATIENT
Start: 2023-01-19 | End: 2023-02-20

## 2023-01-19 NOTE — LETTER
"    1/19/2023         RE: Kandice Mcgowan  [address] [facility] [address]        Dear Colleague,    Thank you for referring your patient, Kandice Mcgowan, to the [facility]. Please see a copy of my visit note below.    The patient has been notified of the following:      \"We have found that certain health care needs can be provided without the need for a face to face visit.  This service lets us provide the care you need with a phone conversation.       I will have full access to your [place] medical record during this entire phone call.   I will be taking notes for your medical record.      Since this is like an office visit, we will bill your insurance company for this service.       There are potential benefits and risks of telephone visits (e.g. limits to patient confidentiality) that differ from in-person visits.?  Confidentiality still applies for telephone services, and nobody will record the visit.  It is important to be in a quiet, private space that is free of distractions (including cell phone or other devices) during the visit.??      If during the course of the call I believe a telephone visit is not appropriate, you will not be charged for this service\"     Consent has been obtained for this service by care team member: Yes   HPI:   CC: Kandice Mcgowan is a pleasant 22 year old female who presents via telephone visit with uploaded photos for recheck of acne s/p 4 months of accutane.Still making occasional new acne. No adverse effects other than dryness.  She is abstinent.   Condition has been present for: years. She completed isotretinoin in 2019 and her acne has slowly returned since that time. It is on the face; chest and back have remained clear since first course of isotretinoin.   Pt complains of pain: Yes       Current Outpatient Medications   Medication Sig Dispense Refill     desogestrel-ethinyl estradiol (APRI) 0.15-30 MG-MCG tablet Take 1 tablet by mouth daily " 84 tablet 3     ibuprofen (ADVIL,MOTRIN) 100 MG chewable tablet Take 400 mg by mouth every 8 hours as needed for fever       ISOtretinoin (ACCUTANE) 40 MG capsule 1 cap po bid with food 60 capsule 0     Loratadine (CLARITIN PO) Take  by mouth.       MIRALAX OR as needed       spironolactone (ALDACTONE) 100 MG tablet 1 tab po daily 90 tablet 3     spironolactone (ALDACTONE) 50 MG tablet 1 tab po daily 90 tablet 3     ISOtretinoin (ABSORICA) 30 MG capsule 1 cap po bid with food (Patient not taking: Reported on 12/19/2022) 60 capsule 0     ISOtretinoin (ACCUTANE) 40 MG capsule 1 cap po daily with food (Patient not taking: Reported on 1/19/2023) 30 capsule 0     Allergies   Allergen Reactions     Amoxicillin Rash     Augmentin Rash     Ceftin [Cefuroxime] Rash     Denies any other skin complaints, in general feels well: Yes  Review of symptoms otherwise negative:Yes    PHYSICAL EXAM:   A&Ox3: Yes   Well developed/well nourished female Yes   Mood appropriate Yes      There were no vitals taken for this visit.  Type 2 skin. Mood appropriate  Alert and Oriented X 3. Well developed, well nourished in no distress.  General appearance: Normal  Head including face: Normal  Eyes: conjunctiva and lids: Normal  Mouth: Lips, teeth, gums: Normal  Neck: Normal  Skin: Scalp and body hair: See below     Comedones Papules/Pustules Cysts Staining Scarring   Face/Neck 1-2+ 0 0 1+ 0   Chest 0 0 0 0 0   Back 0 0 0 0 0     Telangiectasias: No Fixed Erythema: No Exoriations: No   Other Physical Exam Findings:    ASSESSMENT & PLAN:     1. Acne Vulgaris - doing well. 2nd course. No issues other than dryness     Accutane is discussed fully with the patient. It is a very effective drug to treat acne vulgaris but has many significant side effects. Chief among these are teratogensis, hepatic injury, dyslipidemia and severe drying of the mucous membranes. All of these issues have been discussed in details. Monthly blood tests to monitor lipids and  liver functions will be necessary. Expect painful dryness and/or fissuring around the lips, eyes, and other moist areas of the body. Balms may be protective. Contact lens may be too painful to wear temporarily while on this drug. Episodes of significant depression have been reported, including suicidal ideation and attempts in rare cases. It may also cause pseudotumor cerebri and hyperostosis. The patient will report any such changes in mood, depressive symptoms or suicidal thoughts, headaches, joint or bone pains.    Female patients MUST use two simultaneous methods of family planning. Accutane is Category X for pregnancy, meaning it will cause fetal teratogenic malformations, and pregnancy MUST be avoided while on this drug.    The dose is 0.5-1 mg/kg in two divided doses for 15-20 weeks.    After discussion of these important issues, s/he indicates complete understanding of all of the above, and does wish to proceed with accutane therapy.    --iPlege: [id number]  --Standing labs  --Abstinence   --Continue spironolactone for now  --Start isotretinoin 80 mg daily month #5  --Total: 7800 mg  --Goal: 87125 - 32342 mg    Doximity  Start: 1145  End:1150  Location: [place]  Location of patient: home    Pt advised on use and risks including photosensitivity, allergic reactions, GI upset, headaches, nausea, erythema, scaling, vertigo, asthralgias, blood clots:Yes    Follow-up: 1 month  CC:   Scribed By: Gosia Chen, MS, KORY      Again, thank you for allowing me to participate in the care of your patient.        Sincerely,        Gosia Chen PA-C

## 2023-01-19 NOTE — PROGRESS NOTES
"The patient has been notified of the following:      \"We have found that certain health care needs can be provided without the need for a face to face visit.  This service lets us provide the care you need with a phone conversation.       I will have full access to your [place] medical record during this entire phone call.   I will be taking notes for your medical record.      Since this is like an office visit, we will bill your insurance company for this service.       There are potential benefits and risks of telephone visits (e.g. limits to patient confidentiality) that differ from in-person visits.?  Confidentiality still applies for telephone services, and nobody will record the visit.  It is important to be in a quiet, private space that is free of distractions (including cell phone or other devices) during the visit.??      If during the course of the call I believe a telephone visit is not appropriate, you will not be charged for this service\"     Consent has been obtained for this service by care team member: Yes   HPI:   CC: Kandice Mcgowan is a pleasant 22 year old female who presents via telephone visit with uploaded photos for recheck of acne s/p 4 months of accutane.Still making occasional new acne. No adverse effects other than dryness.  She is abstinent.   Condition has been present for: years. She completed isotretinoin in 2019 and her acne has slowly returned since that time. It is on the face; chest and back have remained clear since first course of isotretinoin.   Pt complains of pain: Yes       Current Outpatient Medications   Medication Sig Dispense Refill     desogestrel-ethinyl estradiol (APRI) 0.15-30 MG-MCG tablet Take 1 tablet by mouth daily 84 tablet 3     ibuprofen (ADVIL,MOTRIN) 100 MG chewable tablet Take 400 mg by mouth every 8 hours as needed for fever       ISOtretinoin (ACCUTANE) 40 MG capsule 1 cap po bid with food 60 capsule 0     Loratadine (CLARITIN PO) Take  by mouth.       " MIRALAX OR as needed       spironolactone (ALDACTONE) 100 MG tablet 1 tab po daily 90 tablet 3     spironolactone (ALDACTONE) 50 MG tablet 1 tab po daily 90 tablet 3     ISOtretinoin (ABSORICA) 30 MG capsule 1 cap po bid with food (Patient not taking: Reported on 12/19/2022) 60 capsule 0     ISOtretinoin (ACCUTANE) 40 MG capsule 1 cap po daily with food (Patient not taking: Reported on 1/19/2023) 30 capsule 0     Allergies   Allergen Reactions     Amoxicillin Rash     Augmentin Rash     Ceftin [Cefuroxime] Rash     Denies any other skin complaints, in general feels well: Yes  Review of symptoms otherwise negative:Yes    PHYSICAL EXAM:   A&Ox3: Yes   Well developed/well nourished female Yes   Mood appropriate Yes      There were no vitals taken for this visit.  Type 2 skin. Mood appropriate  Alert and Oriented X 3. Well developed, well nourished in no distress.  General appearance: Normal  Head including face: Normal  Eyes: conjunctiva and lids: Normal  Mouth: Lips, teeth, gums: Normal  Neck: Normal  Skin: Scalp and body hair: See below     Comedones Papules/Pustules Cysts Staining Scarring   Face/Neck 1-2+ 0 0 1+ 0   Chest 0 0 0 0 0   Back 0 0 0 0 0     Telangiectasias: No Fixed Erythema: No Exoriations: No   Other Physical Exam Findings:    ASSESSMENT & PLAN:     1. Acne Vulgaris - doing well. 2nd course. No issues other than dryness     Accutane is discussed fully with the patient. It is a very effective drug to treat acne vulgaris but has many significant side effects. Chief among these are teratogensis, hepatic injury, dyslipidemia and severe drying of the mucous membranes. All of these issues have been discussed in details. Monthly blood tests to monitor lipids and liver functions will be necessary. Expect painful dryness and/or fissuring around the lips, eyes, and other moist areas of the body. Balms may be protective. Contact lens may be too painful to wear temporarily while on this drug. Episodes of  significant depression have been reported, including suicidal ideation and attempts in rare cases. It may also cause pseudotumor cerebri and hyperostosis. The patient will report any such changes in mood, depressive symptoms or suicidal thoughts, headaches, joint or bone pains.    Female patients MUST use two simultaneous methods of family planning. Accutane is Category X for pregnancy, meaning it will cause fetal teratogenic malformations, and pregnancy MUST be avoided while on this drug.    The dose is 0.5-1 mg/kg in two divided doses for 15-20 weeks.    After discussion of these important issues, s/he indicates complete understanding of all of the above, and does wish to proceed with accutane therapy.    --iPlege: [id number]  --Standing labs  --Abstinence   --Continue spironolactone for now  --Start isotretinoin 80 mg daily month #5  --Total: 7800 mg  --Goal: 08400 - 75192 mg    Doximity  Start: 1145  End:1150  Location: [place]  Location of patient: home    Pt advised on use and risks including photosensitivity, allergic reactions, GI upset, headaches, nausea, erythema, scaling, vertigo, asthralgias, blood clots:Yes    Follow-up: 1 month  CC:   Scribed By: Gosia Chen, MS, PAOTONIEL

## 2023-02-19 ENCOUNTER — LAB (OUTPATIENT)
Dept: LAB | Facility: CLINIC | Age: 23
End: 2023-02-19
Payer: COMMERCIAL

## 2023-02-19 DIAGNOSIS — Z51.81 THERAPEUTIC DRUG MONITORING: ICD-10-CM

## 2023-02-19 LAB
ERYTHROCYTE [DISTWIDTH] IN BLOOD BY AUTOMATED COUNT: 13.4 % (ref 10–15)
HCG UR QL: NEGATIVE
HCT VFR BLD AUTO: 41.4 % (ref 35–47)
HGB BLD-MCNC: 13.8 G/DL (ref 11.7–15.7)
MCH RBC QN AUTO: 27.1 PG (ref 26.5–33)
MCHC RBC AUTO-ENTMCNC: 33.3 G/DL (ref 31.5–36.5)
MCV RBC AUTO: 81 FL (ref 78–100)
PLATELET # BLD AUTO: 449 10E3/UL (ref 150–450)
RBC # BLD AUTO: 5.09 10E6/UL (ref 3.8–5.2)
WBC # BLD AUTO: 7.5 10E3/UL (ref 4–11)

## 2023-02-19 PROCEDURE — 80053 COMPREHEN METABOLIC PANEL: CPT

## 2023-02-19 PROCEDURE — 80061 LIPID PANEL: CPT

## 2023-02-19 PROCEDURE — 36415 COLL VENOUS BLD VENIPUNCTURE: CPT

## 2023-02-19 PROCEDURE — 85027 COMPLETE CBC AUTOMATED: CPT

## 2023-02-19 PROCEDURE — 81025 URINE PREGNANCY TEST: CPT

## 2023-02-20 ENCOUNTER — VIRTUAL VISIT (OUTPATIENT)
Dept: DERMATOLOGY | Facility: CLINIC | Age: 23
End: 2023-02-20
Payer: COMMERCIAL

## 2023-02-20 DIAGNOSIS — L70.0 ACNE VULGARIS: ICD-10-CM

## 2023-02-20 DIAGNOSIS — Z51.81 THERAPEUTIC DRUG MONITORING: Primary | ICD-10-CM

## 2023-02-20 LAB
ALBUMIN SERPL-MCNC: 3.5 G/DL (ref 3.4–5)
ALP SERPL-CCNC: 71 U/L (ref 40–150)
ALT SERPL W P-5'-P-CCNC: 25 U/L (ref 0–50)
ANION GAP SERPL CALCULATED.3IONS-SCNC: 8 MMOL/L (ref 3–14)
AST SERPL W P-5'-P-CCNC: 15 U/L (ref 0–45)
BILIRUB SERPL-MCNC: 0.5 MG/DL (ref 0.2–1.3)
BUN SERPL-MCNC: 9 MG/DL (ref 7–30)
CALCIUM SERPL-MCNC: 9.8 MG/DL (ref 8.5–10.1)
CHLORIDE BLD-SCNC: 107 MMOL/L (ref 94–109)
CHOLEST SERPL-MCNC: 210 MG/DL
CO2 SERPL-SCNC: 23 MMOL/L (ref 20–32)
CREAT SERPL-MCNC: 0.57 MG/DL (ref 0.52–1.04)
FASTING STATUS PATIENT QL REPORTED: YES
GFR SERPL CREATININE-BSD FRML MDRD: >90 ML/MIN/1.73M2
GLUCOSE BLD-MCNC: 83 MG/DL (ref 70–99)
HDLC SERPL-MCNC: 51 MG/DL
LDLC SERPL CALC-MCNC: 128 MG/DL
NONHDLC SERPL-MCNC: 159 MG/DL
POTASSIUM BLD-SCNC: 4 MMOL/L (ref 3.4–5.3)
PROT SERPL-MCNC: 7.9 G/DL (ref 6.8–8.8)
SODIUM SERPL-SCNC: 138 MMOL/L (ref 133–144)
TRIGL SERPL-MCNC: 153 MG/DL

## 2023-02-20 PROCEDURE — 99214 OFFICE O/P EST MOD 30 MIN: CPT | Mod: VID | Performed by: PHYSICIAN ASSISTANT

## 2023-02-20 RX ORDER — ISOTRETINOIN 40 MG/1
CAPSULE ORAL
Qty: 60 CAPSULE | Refills: 0 | Status: SHIPPED | OUTPATIENT
Start: 2023-02-20 | End: 2023-12-07

## 2023-02-20 NOTE — PROGRESS NOTES
"The patient has been notified of the following:      \"We have found that certain health care needs can be provided without the need for a face to face visit.  This service lets us provide the care you need with a phone conversation.       I will have full access to your [place] medical record during this entire phone call.   I will be taking notes for your medical record.      Since this is like an office visit, we will bill your insurance company for this service.       There are potential benefits and risks of telephone visits (e.g. limits to patient confidentiality) that differ from in-person visits.?  Confidentiality still applies for telephone services, and nobody will record the visit.  It is important to be in a quiet, private space that is free of distractions (including cell phone or other devices) during the visit.??      If during the course of the call I believe a telephone visit is not appropriate, you will not be charged for this service\"     Consent has been obtained for this service by care team member: Yes   HPI:   CC: Kandice Mcgowan is a pleasant 22 year old female who presents via telephone visit with uploaded photos for recheck of acne s/p 5 months of accutane. No new acne in over 3 weeks! No adverse effects other than dryness.  She is abstinent.   Condition has been present for: years. She completed isotretinoin in 2019 and her acne has slowly returned since that time. It is on the face; chest and back have remained clear since first course of isotretinoin.   Pt complains of pain: Yes       Current Outpatient Medications   Medication Sig Dispense Refill     desogestrel-ethinyl estradiol (APRI) 0.15-30 MG-MCG tablet Take 1 tablet by mouth daily 84 tablet 3     ibuprofen (ADVIL,MOTRIN) 100 MG chewable tablet Take 400 mg by mouth every 8 hours as needed for fever       ISOtretinoin (ACCUTANE) 40 MG capsule 1 cap po bid with food 60 capsule 0     Loratadine (CLARITIN PO) Take  by mouth.       " MIRALAX OR as needed       ISOtretinoin (ABSORICA) 30 MG capsule 1 cap po bid with food (Patient not taking: Reported on 12/19/2022) 60 capsule 0     ISOtretinoin (ACCUTANE) 40 MG capsule 1 cap po daily with food (Patient not taking: Reported on 1/19/2023) 30 capsule 0     spironolactone (ALDACTONE) 100 MG tablet 1 tab po daily (Patient not taking: Reported on 2/20/2023) 90 tablet 3     spironolactone (ALDACTONE) 50 MG tablet 1 tab po daily (Patient not taking: Reported on 2/20/2023) 90 tablet 3     Allergies   Allergen Reactions     Amoxicillin Rash     Augmentin Rash     Ceftin [Cefuroxime] Rash     Denies any other skin complaints, in general feels well: Yes  Review of symptoms otherwise negative:Yes    PHYSICAL EXAM:   A&Ox3: Yes   Well developed/well nourished female Yes   Mood appropriate Yes      There were no vitals taken for this visit.  Type 2 skin. Mood appropriate  Alert and Oriented X 3. Well developed, well nourished in no distress.  General appearance: Normal  Head including face: Normal  Eyes: conjunctiva and lids: Normal  Mouth: Lips, teeth, gums: Normal  Neck: Normal  Skin: Scalp and body hair: See below     Comedones Papules/Pustules Cysts Staining Scarring   Face/Neck 1-2+ 0 0 1+ 0   Chest 0 0 0 0 0   Back 0 0 0 0 0     Telangiectasias: No Fixed Erythema: No Exoriations: No   Other Physical Exam Findings:    ASSESSMENT & PLAN:     1. Acne Vulgaris - doing well. 2nd course. No issues other than dryness. No new acne this past month!    Accutane is discussed fully with the patient. It is a very effective drug to treat acne vulgaris but has many significant side effects. Chief among these are teratogensis, hepatic injury, dyslipidemia and severe drying of the mucous membranes. All of these issues have been discussed in details. Monthly blood tests to monitor lipids and liver functions will be necessary. Expect painful dryness and/or fissuring around the lips, eyes, and other moist areas of the body.  Balms may be protective. Contact lens may be too painful to wear temporarily while on this drug. Episodes of significant depression have been reported, including suicidal ideation and attempts in rare cases. It may also cause pseudotumor cerebri and hyperostosis. The patient will report any such changes in mood, depressive symptoms or suicidal thoughts, headaches, joint or bone pains.    Female patients MUST use two simultaneous methods of family planning. Accutane is Category X for pregnancy, meaning it will cause fetal teratogenic malformations, and pregnancy MUST be avoided while on this drug.    The dose is 0.5-1 mg/kg in two divided doses for 15-20 weeks.    After discussion of these important issues, s/he indicates complete understanding of all of the above, and does wish to proceed with accutane therapy.    --iPlege: [id number]  --Standing labs  --Abstinence   --Continue spironolactone  --Start isotretinoin 80 mg daily month #6 - last month!!   --Total: 63769 mg  --Goal: 14597 - 79385 mg    Doximity  Start: 0940  End:0946  Location: [place]  Location of patient: home    Pt advised on use and risks including photosensitivity, allergic reactions, GI upset, headaches, nausea, erythema, scaling, vertigo, asthralgias, blood clots:Yes    Follow-up: 1 month  CC:   Scribed By: Gosia Chen, MS, PA-C

## 2023-02-20 NOTE — LETTER
"    2/20/2023         RE: Kandice Mcgowan  [address] [facility] [address]        Dear Colleague,    Thank you for referring your patient, Kandice Mcgowan, to the [facility]. Please see a copy of my visit note below.    The patient has been notified of the following:      \"We have found that certain health care needs can be provided without the need for a face to face visit.  This service lets us provide the care you need with a phone conversation.       I will have full access to your [place] medical record during this entire phone call.   I will be taking notes for your medical record.      Since this is like an office visit, we will bill your insurance company for this service.       There are potential benefits and risks of telephone visits (e.g. limits to patient confidentiality) that differ from in-person visits.?  Confidentiality still applies for telephone services, and nobody will record the visit.  It is important to be in a quiet, private space that is free of distractions (including cell phone or other devices) during the visit.??      If during the course of the call I believe a telephone visit is not appropriate, you will not be charged for this service\"     Consent has been obtained for this service by care team member: Yes   HPI:   CC: Kandice Mcgowan is a pleasant 22 year old female who presents via telephone visit with uploaded photos for recheck of acne s/p 5 months of accutane. No new acne in over 3 weeks! No adverse effects other than dryness.  She is abstinent.   Condition has been present for: years. She completed isotretinoin in 2019 and her acne has slowly returned since that time. It is on the face; chest and back have remained clear since first course of isotretinoin.   Pt complains of pain: Yes       Current Outpatient Medications   Medication Sig Dispense Refill     desogestrel-ethinyl estradiol (APRI) 0.15-30 MG-MCG tablet Take 1 tablet by mouth daily 84 " tablet 3     ibuprofen (ADVIL,MOTRIN) 100 MG chewable tablet Take 400 mg by mouth every 8 hours as needed for fever       ISOtretinoin (ACCUTANE) 40 MG capsule 1 cap po bid with food 60 capsule 0     Loratadine (CLARITIN PO) Take  by mouth.       MIRALAX OR as needed       ISOtretinoin (ABSORICA) 30 MG capsule 1 cap po bid with food (Patient not taking: Reported on 12/19/2022) 60 capsule 0     ISOtretinoin (ACCUTANE) 40 MG capsule 1 cap po daily with food (Patient not taking: Reported on 1/19/2023) 30 capsule 0     spironolactone (ALDACTONE) 100 MG tablet 1 tab po daily (Patient not taking: Reported on 2/20/2023) 90 tablet 3     spironolactone (ALDACTONE) 50 MG tablet 1 tab po daily (Patient not taking: Reported on 2/20/2023) 90 tablet 3     Allergies   Allergen Reactions     Amoxicillin Rash     Augmentin Rash     Ceftin [Cefuroxime] Rash     Denies any other skin complaints, in general feels well: Yes  Review of symptoms otherwise negative:Yes    PHYSICAL EXAM:   A&Ox3: Yes   Well developed/well nourished female Yes   Mood appropriate Yes      There were no vitals taken for this visit.  Type 2 skin. Mood appropriate  Alert and Oriented X 3. Well developed, well nourished in no distress.  General appearance: Normal  Head including face: Normal  Eyes: conjunctiva and lids: Normal  Mouth: Lips, teeth, gums: Normal  Neck: Normal  Skin: Scalp and body hair: See below     Comedones Papules/Pustules Cysts Staining Scarring   Face/Neck 1-2+ 0 0 1+ 0   Chest 0 0 0 0 0   Back 0 0 0 0 0     Telangiectasias: No Fixed Erythema: No Exoriations: No   Other Physical Exam Findings:    ASSESSMENT & PLAN:     1. Acne Vulgaris - doing well. 2nd course. No issues other than dryness. No new acne this past month!    Accutane is discussed fully with the patient. It is a very effective drug to treat acne vulgaris but has many significant side effects. Chief among these are teratogensis, hepatic injury, dyslipidemia and severe drying of  the mucous membranes. All of these issues have been discussed in details. Monthly blood tests to monitor lipids and liver functions will be necessary. Expect painful dryness and/or fissuring around the lips, eyes, and other moist areas of the body. Balms may be protective. Contact lens may be too painful to wear temporarily while on this drug. Episodes of significant depression have been reported, including suicidal ideation and attempts in rare cases. It may also cause pseudotumor cerebri and hyperostosis. The patient will report any such changes in mood, depressive symptoms or suicidal thoughts, headaches, joint or bone pains.    Female patients MUST use two simultaneous methods of family planning. Accutane is Category X for pregnancy, meaning it will cause fetal teratogenic malformations, and pregnancy MUST be avoided while on this drug.    The dose is 0.5-1 mg/kg in two divided doses for 15-20 weeks.    After discussion of these important issues, s/he indicates complete understanding of all of the above, and does wish to proceed with accutane therapy.    --iPlege: [id number]  --Standing labs  --Abstinence   --Continue spironolactone  --Start isotretinoin 80 mg daily month #6 - last month!!   --Total: 05357 mg  --Goal: 65731 - 85729 mg    Doximity  Start: 0940  End:0946  Location: [place]  Location of patient: home    Pt advised on use and risks including photosensitivity, allergic reactions, GI upset, headaches, nausea, erythema, scaling, vertigo, asthralgias, blood clots:Yes    Follow-up: 1 month  CC:   Scribed By: Gosia Chen, MS, PAOTONIEL      Again, thank you for allowing me to participate in the care of your patient.        Sincerely,        Gosia Chen PA-C

## 2023-05-22 ENCOUNTER — DOCUMENTATION ONLY (OUTPATIENT)
Dept: LAB | Facility: OTHER | Age: 23
End: 2023-05-22
Payer: COMMERCIAL

## 2023-05-22 NOTE — PROGRESS NOTES
Left message for patient to call back. She has a lab appointment and the notes say urine test. We do not have any urine orders. Called to see who should be ordering the urine test?

## 2023-05-24 ENCOUNTER — DOCUMENTATION ONLY (OUTPATIENT)
Dept: DERMATOLOGY | Facility: CLINIC | Age: 23
End: 2023-05-24

## 2023-05-24 ENCOUNTER — LAB (OUTPATIENT)
Dept: LAB | Facility: OTHER | Age: 23
End: 2023-05-24
Payer: COMMERCIAL

## 2023-05-24 DIAGNOSIS — Z51.81 THERAPEUTIC DRUG MONITORING: ICD-10-CM

## 2023-05-24 DIAGNOSIS — Z51.81 THERAPEUTIC DRUG MONITORING: Primary | ICD-10-CM

## 2023-05-24 LAB — HCG UR QL: NEGATIVE

## 2023-05-24 PROCEDURE — 81025 URINE PREGNANCY TEST: CPT

## 2023-05-24 NOTE — PROGRESS NOTES
Patient came in for a pregnancy urine test and has no orders. Please place future lab orders if you would like this test done.

## 2023-07-15 ENCOUNTER — HEALTH MAINTENANCE LETTER (OUTPATIENT)
Age: 23
End: 2023-07-15

## 2023-08-28 ENCOUNTER — VIRTUAL VISIT (OUTPATIENT)
Dept: DERMATOLOGY | Facility: CLINIC | Age: 23
End: 2023-08-28
Payer: COMMERCIAL

## 2023-08-28 ENCOUNTER — TELEPHONE (OUTPATIENT)
Dept: DERMATOLOGY | Facility: CLINIC | Age: 23
End: 2023-08-28

## 2023-08-28 DIAGNOSIS — L70.0 ACNE VULGARIS: Primary | ICD-10-CM

## 2023-08-28 DIAGNOSIS — Z51.81 THERAPEUTIC DRUG MONITORING: ICD-10-CM

## 2023-08-28 PROCEDURE — 99214 OFFICE O/P EST MOD 30 MIN: CPT | Mod: 95 | Performed by: PHYSICIAN ASSISTANT

## 2023-08-28 RX ORDER — TRETINOIN 0.5 MG/G
CREAM TOPICAL
Qty: 45 G | Refills: 11 | Status: SHIPPED | OUTPATIENT
Start: 2023-08-28 | End: 2024-06-03

## 2023-08-28 ASSESSMENT — PAIN SCALES - GENERAL: PAINLEVEL: NO PAIN (0)

## 2023-08-28 NOTE — PROGRESS NOTES
"The patient has been notified of the following:      \"We have found that certain health care needs can be provided without the need for a face to face visit.  This service lets us provide the care you need with a phone conversation.       I will have full access to your [place] medical record during this entire phone call.   I will be taking notes for your medical record.      Since this is like an office visit, we will bill your insurance company for this service.       There are potential benefits and risks of telephone visits (e.g. limits to patient confidentiality) that differ from in-person visits.?  Confidentiality still applies for telephone services, and nobody will record the visit.  It is important to be in a quiet, private space that is free of distractions (including cell phone or other devices) during the visit.??      If during the course of the call I believe a telephone visit is not appropriate, you will not be charged for this service\"     Consent has been obtained for this service by care team member: Yes     HPI:   Chief complaints: Kandice Mcgowan is a pleasant 22 year old female who presents via telephone visit with uploaded photos for recheck of acne. She finished her 2nd course of isotretinoin 6 months ago. She is currently on 150 mg of spironolactone and will only get small occasional pimples. She has been having some heart palpitations. They are not new for her but seem to be worse than in the past. She is not using any topicals.       PHYSICAL EXAM:    There were no vitals taken for this visit.  Skin exam performed as follows: Type 2 skin. Mood appropriate  Alert and Oriented X 3. Well developed, well nourished in no distress.  General appearance: Normal  Head including face: Normal  Eyes: conjunctiva and lids: Normal  Mouth: Lips, teeth, gums: Normal  Neck: Normal  Skin: Scalp and body hair: See below    Skin clear    ASSESSMENT/PLAN:     Acne vulgaris - overall doing very well. Unlikely " palpitation are due to candace but will check magnesium and K+    --Decrease to 100 mg of spironolactone daily  --Start tretinoin 0.05% cream daily. Discussed potential for dryness/irritation. Advised to use emollients if needed.  --Future orders for K+ and Mg2+ placed      [id number]    Follow-up: 6 months  CC:   Scribed By: Gosia Chen, MS, PA-C

## 2023-08-28 NOTE — TELEPHONE ENCOUNTER
Patient Contact    Attempt # 1    Was call answered?  No    Called patient. No answer. Left message to call back. Clinic number was provided.     Elisabet Meyers LPN   [facility]   [phone]    *Provider needs to leave early on Sept 11th, wondering if she could do the 945 hold today-

## 2023-08-28 NOTE — TELEPHONE ENCOUNTER
[facility] Call Center    Phone Message    May a detailed message be left on voicemail: yes     Reason for Call: Other: returning call, please call pt back to discuss. Thanks      Action Taken: Message routed to:  Other: [place] derm    Travel Screening: Not Applicable

## 2023-08-28 NOTE — NURSING NOTE
Chief Complaint   Patient presents with    Acne     Follow up, wanting to lower spironolactone has been having heart palpations for a few months now.          There were no vitals filed for this visit.  Wt Readings from Last 1 Encounters:   12/08/21 103 kg (227 lb) (99 %, Z= 2.28)*     * Growth percentiles are based on [facility] (Girls, 2-20 Years) data.       Elisabet Meyers LPN .................8/28/2023

## 2023-08-28 NOTE — LETTER
"    8/28/2023         RE: Kandice Mcgowan  [address] [address] [address]        Dear Colleague,    Thank you for referring your patient, Kandice Mcgowan, to the [facility]. Please see a copy of my visit note below.    The patient has been notified of the following:      \"We have found that certain health care needs can be provided without the need for a face to face visit.  This service lets us provide the care you need with a phone conversation.       I will have full access to your [place] medical record during this entire phone call.   I will be taking notes for your medical record.      Since this is like an office visit, we will bill your insurance company for this service.       There are potential benefits and risks of telephone visits (e.g. limits to patient confidentiality) that differ from in-person visits.?  Confidentiality still applies for telephone services, and nobody will record the visit.  It is important to be in a quiet, private space that is free of distractions (including cell phone or other devices) during the visit.??      If during the course of the call I believe a telephone visit is not appropriate, you will not be charged for this service\"     Consent has been obtained for this service by care team member: Yes     HPI:   Chief complaints: Kandice Mcgowan is a pleasant 22 year old female who presents via telephone visit with uploaded photos for recheck of acne. She finished her 2nd course of isotretinoin 6 months ago. She is currently on 150 mg of spironolactone and will only get small occasional pimples. She has been having some heart palpitations. They are not new for her but seem to be worse than in the past. She is not using any topicals.       PHYSICAL EXAM:    There were no vitals taken for this visit.  Skin exam performed as follows: Type 2 skin. Mood appropriate  Alert and Oriented X 3. Well developed, well nourished in no distress.  General " appearance: Normal  Head including face: Normal  Eyes: conjunctiva and lids: Normal  Mouth: Lips, teeth, gums: Normal  Neck: Normal  Skin: Scalp and body hair: See below    Skin clear    ASSESSMENT/PLAN:     Acne vulgaris - overall doing very well. Unlikely palpitation are due to candace but will check magnesium and K+    --Decrease to 100 mg of spironolactone daily  --Start tretinoin 0.05% cream daily. Discussed potential for dryness/irritation. Advised to use emollients if needed.  --Future orders for K+ and Mg2+ placed      1432  1440    Follow-up: 6 months  CC:   Scribed By: Gosia Chen, MS, PAOTONIEL      Again, thank you for allowing me to participate in the care of your patient.        Sincerely,        Gosia Chen PA-C

## 2023-10-20 ENCOUNTER — LAB (OUTPATIENT)
Dept: LAB | Facility: CLINIC | Age: 23
End: 2023-10-20
Payer: COMMERCIAL

## 2023-10-20 DIAGNOSIS — Z51.81 THERAPEUTIC DRUG MONITORING: ICD-10-CM

## 2023-10-20 LAB
MAGNESIUM SERPL-MCNC: 1.8 MG/DL (ref 1.7–2.3)
POTASSIUM SERPL-SCNC: 4.1 MMOL/L (ref 3.4–5.3)

## 2023-10-20 PROCEDURE — 36415 COLL VENOUS BLD VENIPUNCTURE: CPT

## 2023-10-20 PROCEDURE — 83735 ASSAY OF MAGNESIUM: CPT

## 2023-10-20 PROCEDURE — 84132 ASSAY OF SERUM POTASSIUM: CPT

## 2023-10-27 DIAGNOSIS — L70.0 ACNE VULGARIS: ICD-10-CM

## 2023-10-27 RX ORDER — DESOGESTREL AND ETHINYL ESTRADIOL 0.15-0.03
1 KIT ORAL DAILY
Qty: 84 TABLET | Refills: 3 | Status: SHIPPED | OUTPATIENT
Start: 2023-10-27 | End: 2024-09-16

## 2023-10-27 NOTE — TELEPHONE ENCOUNTER
Requested Prescriptions     Requested Prescriptions   Pending Prescriptions Disp Refills    desogestrel-ethinyl estradiol (APRI) 0.15-30 MG-MCG tablet 84 tablet 3     Sig: Take 1 tablet by mouth daily       There is no refill protocol information for this order        Last office visit: 8/11/2022 ; last virtual visit: 8/28/2023 with prescribing provider:  Gosia Moscoso   Future Office Visit:        Johanna Carmona   Clinic Station    [facility]  [phone]

## 2023-12-07 DIAGNOSIS — L70.0 ACNE VULGARIS: ICD-10-CM

## 2023-12-07 RX ORDER — SPIRONOLACTONE 100 MG/1
TABLET, FILM COATED ORAL
Qty: 90 TABLET | Refills: 0 | Status: SHIPPED | OUTPATIENT
Start: 2023-12-07 | End: 2024-03-06

## 2023-12-07 NOTE — LETTER
December 7, 2023    Kandice Mcgowan  [address] [address] [address]      Dear Kandice,     We recently provided you with a medication refill. Prescription medications require routine follow-up appointments with your Dermatology Provider.      Per [facility] medication refill protocol, you do need to be seen at least annually to renew a prescription while on prescribed medication(s). A prescription is valid for only one year before it expires.      At this time we ask that: You schedule a routine 6 month follow up Dermatology office visit to follow your Acne. Per 8- Dermatology dictation, you were to return in 6 months for a recheck appointment.    Your prescription: Has been refilled as requested. Please schedule a follow up appointment as soon as possible.      We are currently booking Dermatology appointments into May 2024, so this letter is sent as a courtesy so you can obtain a follow up Dermatology appointment as soon as possible to avoid going without medication.    [phone] to schedule or you may schedule via My chart. You may be seen for follow up with any of our 3 Dermatology Providers via telephone or virtual video if you prefer.     Sincerely,    Gosia Moscoso PA-C / scott

## 2023-12-07 NOTE — TELEPHONE ENCOUNTER
Requested Prescriptions   Pending Prescriptions Disp Refills    spironolactone (ALDACTONE) 100 MG tablet 90 tablet 3     Si tab po daily       There is no refill protocol information for this order           Last office visit: 2022 ; last virtual visit: 2023 with prescribing provider:  Gosia Chen   Future Office Visit:      Thank you,  Laura CLEMENTS [facility]  Specialty [facility] - [facility]

## 2024-02-23 ENCOUNTER — OFFICE VISIT (OUTPATIENT)
Dept: URGENT CARE | Facility: URGENT CARE | Age: 24
End: 2024-02-23
Payer: COMMERCIAL

## 2024-02-23 VITALS
WEIGHT: 230.25 LBS | BODY MASS INDEX: 37.16 KG/M2 | RESPIRATION RATE: 20 BRPM | HEART RATE: 94 BPM | SYSTOLIC BLOOD PRESSURE: 126 MMHG | DIASTOLIC BLOOD PRESSURE: 88 MMHG | TEMPERATURE: 98.8 F | OXYGEN SATURATION: 100 %

## 2024-02-23 DIAGNOSIS — K11.20 SALIVARY GLAND INFLAMMATION: Primary | ICD-10-CM

## 2024-02-23 PROCEDURE — 99213 OFFICE O/P EST LOW 20 MIN: CPT | Performed by: PHYSICIAN ASSISTANT

## 2024-02-24 NOTE — PATIENT INSTRUCTIONS
Ibuprofen for pain and inflammation  Heat to affected area to relieve pain and swelling  Suck on candy (sour is best) to try to expel stone

## 2024-02-24 NOTE — PROGRESS NOTES
Assessment & Plan     Salivary gland inflammation  Suspect salivary gland calculi.  We discussed symptomatic measures including ibuprofen, heat, sour candy.  Follow-up to be seen with red flag symptoms including fever, increasing pain swelling inflammation and redness.    Return in about 1 week (around 3/1/2024) for visit with primary care provider if not improving.     [facility]    Subjective   Kandice Mcgowan is a 23 year old who presents for the following health issues     Patient presents with:  Urgent Care: Urgent care visit for earache and cold symptoms.  URI: Cold sx for one week. Slight cough. No sore throat. No fever/chills. Swollen neck gland on left side. No nausea/vomiting. Headache last week but not this week. No diarrhea. She took a negative at-home COVID test this past Saturday.  Otalgia: Left ear pain that started today. No drainage from left ear. She has frequent ear infections and tubes when she was younger.      CAROLEE Mckeon presents clinic today for evaluation of swelling in her mouth and jawline.  Approximately 8 days ago she had a headache nasal congestion fatigue and malaise.  Symptoms have significantly improved, she has a lingering minimal cough and minimal congestion.  She woke up this morning with mild swelling under the left side of her tongue left ear pain and tenderness in her left side of mandibular area.  No fever.  Symptoms have improved since she woke up.    Review of Systems   ROS negative except as stated above.      Objective    /88 (BP Location: Right arm, Patient Position: Sitting, Cuff Size: Adult Large)   Pulse 94   Temp 98.8  F (37.1  C) (Tympanic)   Resp 20   Wt 104.4 kg (230 lb 4 oz)   LMP 02/08/2024 (Approximate)   SpO2 100%   Breastfeeding No   BMI 37.16 kg/m    Physical Exam   GENERAL: alert and no distress  EYES: Eyes grossly normal to inspection, PERRL and conjunctivae and sclerae normal  HENT: ear  canals and TM's normal, nose and mouth without ulcers or lesions, no swelling noted under the tongue however she states the swelling was around the roxanne duct. Minimal swelling palpable over submandibular salivary gland.  NECK: no adenopathy, no asymmetry, masses, or scars  RESP: lungs clear to auscultation - no rales, rhonchi or wheezes  CV: regular rate and rhythm, normal S1 S2, no S3 or S4, no murmur, click or rub, no peripheral edema    No results found for any visits on 02/23/24.

## 2024-03-06 DIAGNOSIS — L70.0 ACNE VULGARIS: ICD-10-CM

## 2024-03-06 RX ORDER — SPIRONOLACTONE 100 MG/1
TABLET, FILM COATED ORAL
Qty: 90 TABLET | Refills: 0 | Status: SHIPPED | OUTPATIENT
Start: 2024-03-06 | End: 2024-05-31

## 2024-03-06 NOTE — TELEPHONE ENCOUNTER
Requested Prescriptions   Pending Prescriptions Disp Refills    spironolactone (ALDACTONE) 100 MG tablet 90 tablet 0     Si tab po daily       There is no refill protocol information for this order        Last Written Prescription Date:  23  Last Fill Quantity: 90,  # refills: 0   Last office visit: 2022 ; last virtual visit: 2023 with prescribing provider:     Future Office Visit:  6/3/24

## 2024-05-28 ENCOUNTER — MYC MEDICAL ADVICE (OUTPATIENT)
Dept: DERMATOLOGY | Facility: CLINIC | Age: 24
End: 2024-05-28
Payer: COMMERCIAL

## 2024-05-31 DIAGNOSIS — L70.0 ACNE VULGARIS: ICD-10-CM

## 2024-05-31 RX ORDER — SPIRONOLACTONE 100 MG/1
TABLET, FILM COATED ORAL
Qty: 90 TABLET | Refills: 0 | Status: SHIPPED | OUTPATIENT
Start: 2024-05-31 | End: 2024-06-03

## 2024-05-31 NOTE — TELEPHONE ENCOUNTER
Medication refilled per [facility] RN protocol. Pt has follow up appt 6/3/24.    Griselda MOONEY RN BSN PHN  Specialty Clinics

## 2024-06-03 ENCOUNTER — VIRTUAL VISIT (OUTPATIENT)
Dept: DERMATOLOGY | Facility: CLINIC | Age: 24
End: 2024-06-03
Payer: COMMERCIAL

## 2024-06-03 DIAGNOSIS — L70.0 ACNE VULGARIS: ICD-10-CM

## 2024-06-03 PROCEDURE — 99441 PR PHYSICIAN TELEPHONE EVALUATION 5-10 MIN: CPT | Performed by: PHYSICIAN ASSISTANT

## 2024-06-03 RX ORDER — TRETINOIN 0.5 MG/G
CREAM TOPICAL
Qty: 45 G | Refills: 11 | Status: SHIPPED | OUTPATIENT
Start: 2024-06-03

## 2024-06-03 RX ORDER — SPIRONOLACTONE 100 MG/1
TABLET, FILM COATED ORAL
Qty: 90 TABLET | Refills: 3 | Status: SHIPPED | OUTPATIENT
Start: 2024-06-03

## 2024-06-03 NOTE — LETTER
"    6/3/2024         RE: Kandice Mcgowan  [address] [address] [address]        Dear Colleague,    Thank you for referring your patient, Kandice Mcgowan, to the [facility]. Please see a copy of my visit note below.    The patient has been notified of the following:      \"We have found that certain health care needs can be provided without the need for a face to face visit.  This service lets us provide the care you need with a phone conversation.       I will have full access to your [place] medical record during this entire phone call.   I will be taking notes for your medical record.      Since this is like an office visit, we will bill your insurance company for this service.       There are potential benefits and risks of telephone visits (e.g. limits to patient confidentiality) that differ from in-person visits.?  Confidentiality still applies for telephone services, and nobody will record the visit.  It is important to be in a quiet, private space that is free of distractions (including cell phone or other devices) during the visit.??      If during the course of the call I believe a telephone visit is not appropriate, you will not be charged for this service\"     Consent has been obtained for this service by care team member: Yes     HPI:   Chief complaints: Kandice Mcgowan is a pleasant 23 year old female who presents via telephone visit with uploaded photos for recheck of acne. S/p 2 courses of isotretinoin. She is currently on 100 mg of spironolactone and will only get small occasional pimples. She is overall happy with her control.       PHYSICAL EXAM:    There were no vitals taken for this visit.  Skin exam performed as follows: Type 2 skin. Mood appropriate  Alert and Oriented X 3. Well developed, well nourished in no distress.  General appearance: Normal  Head including face: Normal  Eyes: conjunctiva and lids: Normal  Mouth: Lips, teeth, gums: Normal  Neck: " Normal  Skin: Scalp and body hair: See below    Skin clear    ASSESSMENT/PLAN:     Acne vulgaris - overall doing very well and is happy with results.     --Continue 100 mg of spironolactone daily  --Start tretinoin 0.05% cream daily. Discussed potential for dryness/irritation. Advised to use emollients if needed.      Location of provider: [place]  Location of patient: home  Start: 1530  End: 1535    Follow-up: Annually  CC:   Scribed By: Gosia Chen MS, PA-C      Again, thank you for allowing me to participate in the care of your patient.        Sincerely,        Gosia Chen PA-C

## 2024-06-03 NOTE — PROGRESS NOTES
"The patient has been notified of the following:      \"We have found that certain health care needs can be provided without the need for a face to face visit.  This service lets us provide the care you need with a phone conversation.       I will have full access to your [place] medical record during this entire phone call.   I will be taking notes for your medical record.      Since this is like an office visit, we will bill your insurance company for this service.       There are potential benefits and risks of telephone visits (e.g. limits to patient confidentiality) that differ from in-person visits.?  Confidentiality still applies for telephone services, and nobody will record the visit.  It is important to be in a quiet, private space that is free of distractions (including cell phone or other devices) during the visit.??      If during the course of the call I believe a telephone visit is not appropriate, you will not be charged for this service\"     Consent has been obtained for this service by care team member: Yes     HPI:   Chief complaints: Kandice Mcgowan is a pleasant 23 year old female who presents via telephone visit with uploaded photos for recheck of acne. S/p 2 courses of isotretinoin. She is currently on 100 mg of spironolactone and will only get small occasional pimples. She is overall happy with her control.       PHYSICAL EXAM:    There were no vitals taken for this visit.  Skin exam performed as follows: Type 2 skin. Mood appropriate  Alert and Oriented X 3. Well developed, well nourished in no distress.  General appearance: Normal  Head including face: Normal  Eyes: conjunctiva and lids: Normal  Mouth: Lips, teeth, gums: Normal  Neck: Normal  Skin: Scalp and body hair: See below    Skin clear    ASSESSMENT/PLAN:     Acne vulgaris - overall doing very well and is happy with results.     --Continue 100 mg of spironolactone daily  --Start tretinoin 0.05% cream daily. Discussed potential for " dryness/irritation. Advised to use emollients if needed.      Location of provider: [place]  Location of patient: home  Start: 1530  End: 1535    Follow-up: Annually  CC:   Scribed By: Gosia Chen MS, PA-C

## 2024-06-03 NOTE — TELEPHONE ENCOUNTER
FYI- Patient is scheduled for 9/12. Wanted to make sure provider was ok with this plan.     Suyapa Briggs MA

## 2024-09-07 ENCOUNTER — HEALTH MAINTENANCE LETTER (OUTPATIENT)
Age: 24
End: 2024-09-07

## 2024-09-16 DIAGNOSIS — L70.0 ACNE VULGARIS: ICD-10-CM

## 2024-09-16 RX ORDER — DESOGESTREL AND ETHINYL ESTRADIOL 0.15-0.03
1 KIT ORAL DAILY
Qty: 84 TABLET | Refills: 1 | Status: SHIPPED | OUTPATIENT
Start: 2024-09-16

## 2024-09-16 NOTE — TELEPHONE ENCOUNTER
Requested Prescriptions   Pending Prescriptions Disp Refills    desogestrel-ethinyl estradiol (APRI) 0.15-30 MG-MCG tablet 84 tablet 3     Sig: Take 1 tablet by mouth daily.       There is no refill protocol information for this order        Last office visit: Visit date not found ; last virtual visit: 6/3/2024 with prescribing provider:  Gosia Moscoso     Future Office Visit:          Johanna Carmona   Clinic Station [place]   [facility]  [phone]

## 2024-10-13 SDOH — HEALTH STABILITY: PHYSICAL HEALTH: ON AVERAGE, HOW MANY DAYS PER WEEK DO YOU ENGAGE IN MODERATE TO STRENUOUS EXERCISE (LIKE A BRISK WALK)?: 2 DAYS

## 2024-10-13 SDOH — HEALTH STABILITY: PHYSICAL HEALTH: ON AVERAGE, HOW MANY MINUTES DO YOU ENGAGE IN EXERCISE AT THIS LEVEL?: 20 MIN

## 2024-10-13 ASSESSMENT — SOCIAL DETERMINANTS OF HEALTH (SDOH): HOW OFTEN DO YOU GET TOGETHER WITH FRIENDS OR RELATIVES?: TWICE A WEEK

## 2024-10-18 ENCOUNTER — OFFICE VISIT (OUTPATIENT)
Dept: FAMILY MEDICINE | Facility: CLINIC | Age: 24
End: 2024-10-18
Payer: COMMERCIAL

## 2024-10-18 VITALS
HEIGHT: 67 IN | TEMPERATURE: 98.3 F | RESPIRATION RATE: 16 BRPM | SYSTOLIC BLOOD PRESSURE: 118 MMHG | DIASTOLIC BLOOD PRESSURE: 70 MMHG | BODY MASS INDEX: 36 KG/M2 | WEIGHT: 229.4 LBS | HEART RATE: 91 BPM | OXYGEN SATURATION: 100 %

## 2024-10-18 DIAGNOSIS — Z00.00 ROUTINE GENERAL MEDICAL EXAMINATION AT A HEALTH CARE FACILITY: Primary | ICD-10-CM

## 2024-10-18 DIAGNOSIS — L70.0 ACNE VULGARIS: ICD-10-CM

## 2024-10-18 DIAGNOSIS — Z12.4 CERVICAL CANCER SCREENING: ICD-10-CM

## 2024-10-18 DIAGNOSIS — F41.1 GENERALIZED ANXIETY DISORDER: ICD-10-CM

## 2024-10-18 PROCEDURE — 99213 OFFICE O/P EST LOW 20 MIN: CPT | Mod: 25 | Performed by: FAMILY MEDICINE

## 2024-10-18 PROCEDURE — 99395 PREV VISIT EST AGE 18-39: CPT | Performed by: FAMILY MEDICINE

## 2024-10-18 RX ORDER — PROPRANOLOL HCL 20 MG
20 TABLET ORAL 2 TIMES DAILY PRN
Qty: 30 TABLET | Refills: 0 | Status: SHIPPED | OUTPATIENT
Start: 2024-10-18

## 2024-10-18 RX ORDER — DESOGESTREL AND ETHINYL ESTRADIOL 0.15-0.03
1 KIT ORAL DAILY
Qty: 84 TABLET | Refills: 4 | Status: SHIPPED | OUTPATIENT
Start: 2024-10-18

## 2024-10-18 ASSESSMENT — ANXIETY QUESTIONNAIRES
5. BEING SO RESTLESS THAT IT IS HARD TO SIT STILL: NOT AT ALL
1. FEELING NERVOUS, ANXIOUS, OR ON EDGE: MORE THAN HALF THE DAYS
6. BECOMING EASILY ANNOYED OR IRRITABLE: SEVERAL DAYS
GAD7 TOTAL SCORE: 9
7. FEELING AFRAID AS IF SOMETHING AWFUL MIGHT HAPPEN: SEVERAL DAYS
GAD7 TOTAL SCORE: 9
2. NOT BEING ABLE TO STOP OR CONTROL WORRYING: MORE THAN HALF THE DAYS
IF YOU CHECKED OFF ANY PROBLEMS ON THIS QUESTIONNAIRE, HOW DIFFICULT HAVE THESE PROBLEMS MADE IT FOR YOU TO DO YOUR WORK, TAKE CARE OF THINGS AT HOME, OR GET ALONG WITH OTHER PEOPLE: SOMEWHAT DIFFICULT
3. WORRYING TOO MUCH ABOUT DIFFERENT THINGS: SEVERAL DAYS

## 2024-10-18 ASSESSMENT — PATIENT HEALTH QUESTIONNAIRE - PHQ9: 5. POOR APPETITE OR OVEREATING: MORE THAN HALF THE DAYS

## 2024-10-18 ASSESSMENT — PAIN SCALES - GENERAL: PAINLEVEL: NO PAIN (0)

## 2024-10-18 NOTE — PROGRESS NOTES
"Preventive Care Visit  [facility]  Dionte Berry MD, Family Medicine  Oct 18, 2024      Assessment & Plan     Routine general medical examination at a health care facility    Cervical cancer screening  Declined for now    Acne vulgaris  Stable, refill  - desogestrel-ethinyl estradiol (APRI) 0.15-30 MG-MCG tablet; Take 1 tablet by mouth daily.    Generalized anxiety disorder: new diagnosis, but ongoing for many years.  Mild to moderate at times.  Discussed, plan propranolol if needed.  -discussed risks, benefits, and side effects of this new medication. Patient understands and is in agreement.  - propranolol (INDERAL) 20 MG tablet; Take 1 tablet (20 mg) by mouth 2 times daily as needed (anixety).    Patient has been advised of split billing requirements and indicates understanding: Yes        BMI  Estimated body mass index is 36.2 kg/m  as calculated from the following:    Height as of this encounter: 1.695 m (5' 6.75\").    Weight as of this encounter: 104.1 kg (229 lb 6.4 oz).   Weight management plan: Discussed healthy diet and exercise guidelines    Counseling  Appropriate preventive services were addressed with this patient via screening, questionnaire, or discussion as appropriate for fall prevention, nutrition, physical activity, Tobacco-use cessation, social engagement, weight loss and cognition.  Checklist reviewing preventive services available has been given to the patient.  Reviewed patient's diet, addressing concerns and/or questions.   She is at risk for lack of exercise and has been provided with information to increase physical activity for the benefit of her well-being.       See Patient Instructions    Reena Mckeon is a 23 year old, presenting for the following:  Physical        10/18/2024     2:19 PM   Additional Questions   Roomed by Brii Azar   Accompanied by self         10/18/2024     2:19 PM   Patient Reported Additional Medications   Patient " reports taking the following new medications none        Health Care Directive  Patient does not have a Health Care Directive or Living Will:     HPI    Declines Pap.      Anxiety   How are you doing with your anxiety since your last visit? Fluxes, ongoing for years  Are you having other symptoms that might be associated with anxiety? No  Have you had a significant life event? No   Are you feeling depressed? No  Do you have any concerns with your use of alcohol or other drugs? No  Increased during stressful event like interviews. Higher heart rate and cold clammy, upset stomach, nail , sometimes hard to fall asleep, muscle tension, irritable.  Social History     Tobacco Use    Smoking status: Never     Passive exposure: Never    Smokeless tobacco: Never   Vaping Use    Vaping status: Never Used   Substance Use Topics    Alcohol use: No    Drug use: No         10/18/2024     2:59 PM   HUGO-7 SCORE   Total Score 9          No data to display                    10/13/2024   General Health   How would you rate your overall physical health? (!) FAIR   Feel stress (tense, anxious, or unable to sleep) To some extent      (!) STRESS CONCERN      10/13/2024   Nutrition   Three or more servings of calcium each day? Yes   Diet: Regular (no restrictions)   How many servings of fruit and vegetables per day? (!) 2-3   How many sweetened beverages each day? 0-1            10/13/2024   Exercise   Days per week of moderate/strenous exercise 2 days pickleball   Average minutes spent exercising at this level 20 min      (!) EXERCISE CONCERN      10/13/2024   Social Factors   Frequency of gathering with friends or relatives Twice a week   Worry food won't last until get money to buy more No   Food not last or not have enough money for food? No   Do you have housing? (Housing is defined as stable permanent housing and does not include staying ouside in a car, in a tent, in an abandoned building, in an overnight shelter, or  couch-surfing.) Yes   Are you worried about losing your housing? No   Lack of transportation? No   Unable to get utilities (heat,electricity)? No            10/13/2024   Dental   Dentist two times every year? Yes            10/13/2024   TB Screening   Were you born outside of the [place]? No            Today's PHQ-2 Score:       10/17/2024     4:33 PM   PHQ-2 ( 1999 [employer])   Q1: Little interest or pleasure in doing things 0   Q2: Feeling down, depressed or hopeless 0   PHQ-2 Score 0   Q1: Little interest or pleasure in doing things Not at all   Q2: Feeling down, depressed or hopeless Not at all   PHQ-2 Score 0           10/13/2024   Substance Use   Alcohol more than 3/day or more than 7/wk No   Do you use any other substances recreationally? No        Social History     Tobacco Use    Smoking status: Never     Passive exposure: Never    Smokeless tobacco: Never   Vaping Use    Vaping status: Never Used   Substance Use Topics    Alcohol use: No    Drug use: No             10/13/2024   One time HIV Screening   Previous HIV test? No          10/13/2024   STI Screening   New sexual partner(s) since last STI/HIV test? No        History of abnormal Pap smear: No - age 21-29 PAP every 3 years recommended             10/13/2024   Contraception/Family Planning   Questions about contraception or family planning No           Reviewed and updated as needed this visit by Provider                    BP Readings from Last 3 Encounters:   10/18/24 118/70   02/23/24 126/88   01/03/22 (!) 137/90    Wt Readings from Last 3 Encounters:   10/18/24 104.1 kg (229 lb 6.4 oz)   02/23/24 104.4 kg (230 lb 4 oz)   12/08/21 103 kg (227 lb) (99%, Z= 2.28)*     * Growth percentiles are based on CDC (Girls, 2-20 Years) data.            Review of Systems  Constitutional, HEENT, cardiovascular, pulmonary, gi and gu systems are negative, except as otherwise noted.     Objective    Exam  /70 (BP Location: Right arm, Patient Position: Sitting, Cuff  "Size: Adult Large)   Pulse 91   Temp 98.3  F (36.8  C) (Tympanic)   Resp 16   Ht 1.695 m (5' 6.75\")   Wt 104.1 kg (229 lb 6.4 oz)   LMP 09/26/2024 (Exact Date)   SpO2 100%   BMI 36.20 kg/m     Estimated body mass index is 37.16 kg/m  as calculated from the following:    Height as of 5/17/21: 1.676 m (5' 6\").    Weight as of 2/23/24: 104.4 kg (230 lb 4 oz).    Physical Exam  GENERAL: alert and no distress  HENT: ear canals and TM's normal, nose and mouth without ulcers or lesions  NECK: no adenopathy, no asymmetry, masses, or scars  RESP: lungs clear to auscultation - no rales, rhonchi or wheezes  CV: regular rate and rhythm, normal S1 S2, no S3 or S4, no murmur, click or rub, no peripheral edema  ABDOMEN: soft, nontender, no hepatosplenomegaly, no masses and bowel sounds normal  MS: no gross musculoskeletal defects noted, no edema  PSYCH: mentation appears normal, affect normal/bright        Signed Electronically by: Dionte Berry MD    "

## 2024-10-18 NOTE — PATIENT INSTRUCTIONS
Student Assistance Program at school for some free therapy.    Apps: CALM, headspace    Start propranolol 20mg, kicks in after 30 minutes and lasts 3-5 hours to decrease that adrenaline rush symptoms if needed.  MyCHart message in 5 weeks      This is a preventative visit and any additional concerns or chronic disease management including medication refills addressed today could be charged additionally.    Preventative visits screen for diseases prior to they occur.  They do not cover for any new diagnosis or chronic disease management.     If you have questions regarding your coverage please check with your insurance provider.  At [facility] we need to code correctly to be in compliance with all insurance companies.    Patient Education   Preventive Care Advice   This is general advice given by our system to help you stay healthy. However, your care team may have specific advice just for you. Please talk to your care team about your preventive care needs.  Nutrition  Eat 5 or more servings of fruits and vegetables each day.  Try wheat bread, brown rice and whole grain pasta (instead of white bread, rice, and pasta).  Get enough calcium and vitamin D. Check the label on foods and aim for 100% of the RDA (recommended daily allowance).  Lifestyle  Exercise at least 150 minutes each week  (30 minutes a day, 5 days a week).  Do muscle strengthening activities 2 days a week. These help control your weight and prevent disease.  No smoking.  Wear sunscreen to prevent skin cancer.  Have a dental exam and cleaning every 6 months.  Yearly exams  See your health care team every year to talk about:  Any changes in your health.  Any medicines your care team has prescribed.  Preventive care, family planning, and ways to prevent chronic diseases.  Shots (vaccines)   HPV shots (up to age 26), if you've never had them before.  Hepatitis B shots (up to age 59), if you've never had them before.  COVID-19 shot: Get this shot when it's  due.  Flu shot: Get a flu shot every year.  Tetanus shot: Get a tetanus shot every 10 years.  Pneumococcal, hepatitis A, and RSV shots: Ask your care team if you need these based on your risk.  Shingles shot (for age 50 and up)  General health tests  Diabetes screening:  Starting at age 35, Get screened for diabetes at least every 3 years.  If you are younger than age 35, ask your care team if you should be screened for diabetes.  Cholesterol test: At age 39, start having a cholesterol test every 5 years, or more often if advised.  Bone density scan (DEXA): At age 50, ask your care team if you should have this scan for osteoporosis (brittle bones).  Hepatitis C: Get tested at least once in your life.  STIs (sexually transmitted infections)  Before age 24: Ask your care team if you should be screened for STIs.  After age 24: Get screened for STIs if you're at risk. You are at risk for STIs (including HIV) if:  You are sexually active with more than one person.  You don't use condoms every time.  You or a partner was diagnosed with a sexually transmitted infection.  If you are at risk for HIV, ask about PrEP medicine to prevent HIV.  Get tested for HIV at least once in your life, whether you are at risk for HIV or not.  Cancer screening tests  Cervical cancer screening: If you have a cervix, begin getting regular cervical cancer screening tests starting at age 21.  Breast cancer scan (mammogram): If you've ever had breasts, begin having regular mammograms starting at age 40. This is a scan to check for breast cancer.  Colon cancer screening: It is important to start screening for colon cancer at age 45.  Have a colonoscopy test every 10 years (or more often if you're at risk) Or, ask your provider about stool tests like a FIT test every year or Cologuard test every 3 years.  To learn more about your testing options, visit:   .  For help making a decision, visit:   https://bit.ly/ch22669.  Prostate cancer screening  test: If you have a prostate, ask your care team if a prostate cancer screening test (PSA) at age 55 is right for you.  Lung cancer screening: If you are a current or former smoker ages 50 to 80, ask your care team if ongoing lung cancer screenings are right for you.  For informational purposes only. Not to replace the advice of your health care provider. Copyright   2023 [facility]. All rights reserved. Clinically reviewed by the [facility] Transitions Program. [place] [id number] - REV 01/24.

## 2025-03-05 ENCOUNTER — TELEPHONE (OUTPATIENT)
Dept: FAMILY MEDICINE | Facility: CLINIC | Age: 25
End: 2025-03-05
Payer: COMMERCIAL

## 2025-03-05 NOTE — TELEPHONE ENCOUNTER
Patient Quality Outreach    Patient is due for the following:   Cervical Cancer Screening - PAP Needed    Action(s) Taken:   Schedule a office visit for pap smear.    Type of outreach:    Sent Skytap message.    Questions for provider review:    None           Brii Azar

## 2025-04-07 ENCOUNTER — MYC MEDICAL ADVICE (OUTPATIENT)
Dept: FAMILY MEDICINE | Facility: CLINIC | Age: 25
End: 2025-04-07
Payer: COMMERCIAL

## 2025-04-07 DIAGNOSIS — Z11.59 NEED FOR HEPATITIS B SCREENING TEST: ICD-10-CM

## 2025-04-07 DIAGNOSIS — Z01.84 IMMUNITY STATUS TESTING: Primary | ICD-10-CM

## 2025-04-25 ENCOUNTER — LAB (OUTPATIENT)
Dept: LAB | Facility: OTHER | Age: 25
End: 2025-04-25
Payer: COMMERCIAL

## 2025-04-25 DIAGNOSIS — Z11.3 SCREENING FOR STDS (SEXUALLY TRANSMITTED DISEASES): Primary | ICD-10-CM

## 2025-04-25 DIAGNOSIS — Z11.4 SCREENING FOR HIV (HUMAN IMMUNODEFICIENCY VIRUS): ICD-10-CM

## 2025-04-25 DIAGNOSIS — Z11.59 NEED FOR HEPATITIS C SCREENING TEST: ICD-10-CM

## 2025-04-26 LAB
C TRACH DNA SPEC QL PROBE+SIG AMP: NEGATIVE
HBV SURFACE AB SERPL IA-ACNC: <3.5 M[IU]/ML
HBV SURFACE AB SERPL IA-ACNC: NONREACTIVE M[IU]/ML
HCV AB SERPL QL IA: NONREACTIVE
HIV 1+2 AB+HIV1 P24 AG SERPL QL IA: NONREACTIVE
N GONORRHOEA DNA SPEC QL NAA+PROBE: NEGATIVE
SPECIMEN TYPE: NORMAL

## 2025-05-13 DIAGNOSIS — L70.0 ACNE VULGARIS: ICD-10-CM

## 2025-05-13 RX ORDER — SPIRONOLACTONE 100 MG/1
TABLET, FILM COATED ORAL
Qty: 90 TABLET | Refills: 0 | Status: SHIPPED | OUTPATIENT
Start: 2025-05-13

## 2025-05-13 NOTE — TELEPHONE ENCOUNTER
Requested Prescriptions   Pending Prescriptions Disp Refills    spironolactone (ALDACTONE) 100 MG tablet 90 tablet 3     Si tab po daily       There is no refill protocol information for this order          Last office visit: Visit date not found ; last virtual visit: 6/3/2024 with prescribing provider:  Gosia Chen   Future Office Visit:      Thank you,  Laura Forte  [facility]  [address] [place] [address]  Priority line: [phone] (please do not share number with patient)   Employed by [facility]

## 2025-07-03 ENCOUNTER — OFFICE VISIT (OUTPATIENT)
Dept: DERMATOLOGY | Facility: CLINIC | Age: 25
End: 2025-07-03
Payer: COMMERCIAL

## 2025-07-03 DIAGNOSIS — L70.0 ACNE VULGARIS: Primary | ICD-10-CM

## 2025-07-03 RX ORDER — SPIRONOLACTONE 100 MG/1
TABLET, FILM COATED ORAL
Qty: 90 TABLET | Refills: 3 | Status: SHIPPED | OUTPATIENT
Start: 2025-07-03

## 2025-07-03 RX ORDER — TRETINOIN 0.5 MG/G
CREAM TOPICAL
Qty: 45 G | Refills: 11 | Status: SHIPPED | OUTPATIENT
Start: 2025-07-03

## 2025-07-03 NOTE — LETTER
7/3/2025      Kandice Mcgowan  [address] [address] [place] [address]      Dear Colleague,    Thank you for referring your patient, Kandice Mcgowan, to the [facility]. Please see a copy of my visit note below.    [facility] Dermatology Note  Encounter Date: Jul 3, 2025  Office Visit     Reviewed patients past medical history and pertinent chart review prior to patients visit today.     Dermatology Problem List:  1. Acne vulgaris   - Spironolactone 100 mg daily and tretinoin 0.05% cream nightly   - Completed 2 courses of isotretinoin in the past (2019 and 2022)   - Previous: Spironolactone 150 mg daily (caused heart palpitations) , doxycycline    ____________________________________________    CC: Acne (Follow up)    HPI:  Ms. Kandice Mcgowan is a(n) 24 year old female who presents today as a return patient for acne.  The patient is currently on spironolactone 100 mg daily.  She reports tolerating the medication well and denies side effects.  The patient was on spironolactone 150 mg daily in the past, but this caused heart palpitations.  She denies heart palpitations, dizziness, or lightheadedness on her current dose.  The patient is using CeraVe salicylic acid cleanser and a CeraVe moisturizer.  She also applies tretinoin 0.5% cream on her face nightly.  Overall, the patient is happy with her current regimen and is requesting refills.  She is on a birth control pill.    Patient is otherwise feeling well, without additional skin concerns.    Medications:  Current Outpatient Medications   Medication Sig Dispense Refill     spironolactone (ALDACTONE) 100 MG tablet 1 tab po daily 90 tablet 3     tretinoin (RETIN-A) 0.05 % external cream Spread a pea size amount on face topically at bedtime. 45 g 11     desogestrel-ethinyl estradiol (APRI) 0.15-30 MG-MCG tablet Take 1 tablet by mouth daily. 84 tablet 4     Loratadine (CLARITIN PO) Take by mouth.       MIRALAX OR Take by mouth.        propranolol (INDERAL) 20 MG tablet Take 1 tablet (20 mg) by mouth 2 times daily as needed (anixety). 30 tablet 0     No current facility-administered medications for this visit.      Past Medical History:   Patient Active Problem List   Diagnosis     Ingrowing toenail of right foot     Acne vulgaris     BMI 36.0-36.9,adult     Past Medical History:   Diagnosis Date     Acne vulgaris      Acute suppurative otitis media without spontaneous rupture of eardrum     recurrent       ____________________________________________     Physical Exam:  Vitals: There were no vitals taken for this visit.   SKIN: The exam included face.  - Duenas II.  - Left cheek and chin, 2 resolving inflammatory papules    - No other lesions of concern on areas examined.     _________________________________________    Assessment & Plan:   # Acne vulgaris  The patient is happy with her current regimen and is requesting refills.  Spironolactone 100 mg daily refilled.  She denies side effects.  Tretinoin 0.05% cream also refilled.  The patient should continue to use a gentle cleanser and moisturizer.  She is on a birth control pill.  The patient was reminded that we do not want patients getting pregnant while on these medications.    Follow-up: Yearly for refills, sooner if needed    All risks, benefits and alternatives were discussed with patient.  Patient is in agreement and understands the assessment and plan.  All questions were answered.    Nenita Davis PA-C  [facility]    CC Referred MD Tu  No address on file on close of this encounter.    Again, thank you for allowing me to participate in the care of your patient.        Sincerely,        Nenita Davis PA-C    Electronically signed

## 2025-07-03 NOTE — PROGRESS NOTES
[facility] Dermatology Note  Encounter Date: Jul 3, 2025  Office Visit     Reviewed patients past medical history and pertinent chart review prior to patients visit today.     Dermatology Problem List:  1. Acne vulgaris   - Spironolactone 100 mg daily and tretinoin 0.05% cream nightly   - Completed 2 courses of isotretinoin in the past (2019 and 2022)   - Previous: Spironolactone 150 mg daily (caused heart palpitations) , doxycycline    ____________________________________________    CC: Acne (Follow up)    HPI:  Ms. Kandice Mcgowan is a(n) 24 year old female who presents today as a return patient for acne.  The patient is currently on spironolactone 100 mg daily.  She reports tolerating the medication well and denies side effects.  The patient was on spironolactone 150 mg daily in the past, but this caused heart palpitations.  She denies heart palpitations, dizziness, or lightheadedness on her current dose.  The patient is using CeraVe salicylic acid cleanser and a CeraVe moisturizer.  She also applies tretinoin 0.5% cream on her face nightly.  Overall, the patient is happy with her current regimen and is requesting refills.  She is on a birth control pill.    Patient is otherwise feeling well, without additional skin concerns.    Medications:  Current Outpatient Medications   Medication Sig Dispense Refill    spironolactone (ALDACTONE) 100 MG tablet 1 tab po daily 90 tablet 3    tretinoin (RETIN-A) 0.05 % external cream Spread a pea size amount on face topically at bedtime. 45 g 11    desogestrel-ethinyl estradiol (APRI) 0.15-30 MG-MCG tablet Take 1 tablet by mouth daily. 84 tablet 4    Loratadine (CLARITIN PO) Take by mouth.      MIRALAX OR Take by mouth.      propranolol (INDERAL) 20 MG tablet Take 1 tablet (20 mg) by mouth 2 times daily as needed (anixety). 30 tablet 0     No current facility-administered medications for this visit.      Past Medical History:   Patient Active Problem List    Diagnosis    Ingrowing toenail of right foot    Acne vulgaris    BMI 36.0-36.9,adult     Past Medical History:   Diagnosis Date    Acne vulgaris     Acute suppurative otitis media without spontaneous rupture of eardrum     recurrent       ____________________________________________     Physical Exam:  Vitals: There were no vitals taken for this visit.   SKIN: The exam included face.  - Duenas II.  - Left cheek and chin, 2 resolving inflammatory papules    - No other lesions of concern on areas examined.     _________________________________________    Assessment & Plan:   # Acne vulgaris  The patient is happy with her current regimen and is requesting refills.  Spironolactone 100 mg daily refilled.  She denies side effects.  Tretinoin 0.05% cream also refilled.  The patient should continue to use a gentle cleanser and moisturizer.  She is on a birth control pill.  The patient was reminded that we do not want patients getting pregnant while on these medications.    Follow-up: Yearly for refills, sooner if needed    All risks, benefits and alternatives were discussed with patient.  Patient is in agreement and understands the assessment and plan.  All questions were answered.    Nenita Davis PA-C  [facility]    CC Referred MD Tu  No address on file on close of this encounter.

## 2025-07-03 NOTE — PATIENT INSTRUCTIONS
Proper skin care from [facility]:    -Eliminate harsh soaps as they strip the natural oils from the skin, often resulting in dry itchy skin ( i.e. Dial, Zest, [place] Spring)  -Use mild soaps such as Cetaphil or Dove Sensitive Skin in the shower. You do not need to use soap on arms, legs, and trunk every time you shower unless visibly soiled.   -Avoid hot or cold showers.  -After showering, lightly dry off and apply moisturizing within 2-3 minutes. This will help trap moisture in the skin.   -Aggressive use of a moisturizer at least 1-2 times a day to the entire body (including -Vanicream, Cetaphil, Aquaphor or Cerave) and moisturize hands after every washing.  -We recommend using moisturizers that come in a tub that needs to be scooped out, not a pump. This has more of an oil base. It will hold moisture in your skin much better than a water base moisturizer. The above recommended are non-pore clogging.      Wear a sunscreen with at least SPF 30 on your face, ears, neck and V of the chest daily. Wear sunscreen on other areas of the body if those areas are exposed to the sun throughout the day. Sunscreens can contain physical and/or chemical blockers. Physical blockers are less likely to clog pores, these include zinc oxide and titanium dioxide. Reapply every two hour and after swimming.     Sunscreen examples: https://www.ewg.org/sunscreen/    UV radiation  UVA radiation remains constant throughout the day and throughout the year. It is a longer wavelength than UVB and therefore penetrates deeper into the skin leading to immediate and delayed tanning, photoaging, and skin cancer. 70-80% of UVA and UVB radiation occurs between the hours of 10am-2pm.  UVB radiation  UVB radiation causes the most harmful effects and is more significant during the summer months. However, snow and ice can reflect UVB radiation leading to skin damage during the winter months as well. UVB radiation is responsible for tanning,  burning, inflammation, delayed erythema (pinkness), pigmentation (brown spots), and skin cancer.     I recommend self monthly full body exams and yearly full body exams with a dermatology provider. If you develop a new or changing lesion please follow up for examination. Most skin cancers are pink and scaly or pink and pearly. However, we do see blue/brown/black skin cancers.  Consider the ABCDEs of melanoma when giving yourself your monthly full body exam ( don't forget the groin, buttocks, feet, toes, etc). A-asymmetry, B-borders, C-color, D-diameter, E-elevation or evolving. If you see any of these changes please follow up in clinic. If you cannot see your back I recommend purchasing a hand held mirror to use with a larger wall mirror.       Checking for Skin Cancer  You can find cancer early by checking your skin each month. There are 3 kinds of skin cancer. They are melanoma, basal cell carcinoma, and squamous cell carcinoma. Doing monthly skin checks is the best way to find new marks or skin changes. Follow the instructions below for checking your skin.   The ABCDEs of checking moles for melanoma   Check your moles or growths for signs of melanoma using ABCDE:   Asymmetry: the sides of the mole or growth don t match  Border: the edges are ragged, notched, or blurred  Color: the color within the mole or growth varies  Diameter: the mole or growth is larger than 6 mm (size of a pencil eraser)  Evolving: the size, shape, or color of the mole or growth is changing (evolving is not shown in the images below)    Checking for other types of skin cancer  Basal cell carcinoma or squamous cell carcinoma have symptoms such as:     A spot or mole that looks different from all other marks on your skin  Changes in how an area feels, such as itching, tenderness, or pain  Changes in the skin's surface, such as oozing, bleeding, or scaliness  A sore that does not heal  New swelling or redness beyond the border of a  mole    Who s at risk?  Anyone can get skin cancer. But you are at greater risk if you have:   Fair skin, light-colored hair, or light-colored eyes  Many moles or abnormal moles on your skin  A history of sunburns from sunlight or tanning beds  A family history of skin cancer  A history of exposure to radiation or chemicals  A weakened immune system  If you have had skin cancer in the past, you are at risk for recurring skin cancer.   How to check your skin  Do your monthly skin checkups in front of a full-length mirror. Check all parts of your body, including your:   Head (ears, face, neck, and scalp)  Torso (front, back, and sides)  Arms (tops, undersides, upper, and lower armpits)  Hands (palms, backs, and fingers, including under the nails)  Buttocks and genitals  Legs (front, back, and sides)  Feet (tops, soles, toes, including under the nails, and between toes)  If you have a lot of moles, take digital photos of them each month. Make sure to take photos both up close and from a distance. These can help you see if any moles change over time.   Most skin changes are not cancer. But if you see any changes in your skin, call your doctor right away. Only he or she can diagnose a problem. If you have skin cancer, seeing your doctor can be the first step toward getting the treatment that could save your life.   [employer] last reviewed this educational content on 4/1/2019 2000-2020 [employer]. [address] [place] [address], [place] [address]. All rights reserved. This information is not intended as a substitute for professional medical care. Always follow your healthcare professional's instructions.       When should I call my doctor?  If you are worsening or not improving, please, contact us or seek urgent care as noted below.     Who should I call with questions (adults)?    [facility] [phone]  For urgent needs outside of business hours call the [facility] at  [phone] and ask for the dermatology resident on call to be paged  If this is a medical emergency and you are unable to reach an ER, Call [phone]      If you need a prescription refill, please contact your pharmacy. Refills are approved or denied by our Physicians during normal business hours, Monday through Friday.  Per office policy, refills will not be granted if you have not been seen within the past year (or sooner depending on the condition).

## 2025-08-06 ENCOUNTER — E-VISIT (OUTPATIENT)
Dept: FAMILY MEDICINE | Facility: CLINIC | Age: 25
End: 2025-08-06
Payer: COMMERCIAL

## 2025-08-06 DIAGNOSIS — Z11.1 SCREENING EXAMINATION FOR PULMONARY TUBERCULOSIS: Primary | ICD-10-CM

## 2025-08-06 PROCEDURE — 99207 PR NON-BILLABLE SERV PER CHARTING: CPT | Performed by: FAMILY MEDICINE
